# Patient Record
Sex: FEMALE | Race: BLACK OR AFRICAN AMERICAN | Employment: FULL TIME | URBAN - METROPOLITAN AREA
[De-identification: names, ages, dates, MRNs, and addresses within clinical notes are randomized per-mention and may not be internally consistent; named-entity substitution may affect disease eponyms.]

---

## 2019-02-06 ENCOUNTER — HOSPITAL ENCOUNTER (EMERGENCY)
Age: 24
Discharge: HOME | End: 2019-02-06
Attending: EMERGENCY MEDICINE
Payer: SUBSIDIZED

## 2019-02-06 VITALS
HEART RATE: 104 BPM | WEIGHT: 220 LBS | OXYGEN SATURATION: 98 % | SYSTOLIC BLOOD PRESSURE: 154 MMHG | BODY MASS INDEX: 37.56 KG/M2 | DIASTOLIC BLOOD PRESSURE: 86 MMHG | RESPIRATION RATE: 16 BRPM | TEMPERATURE: 100 F | HEIGHT: 64 IN

## 2019-02-06 DIAGNOSIS — J02.0 ACUTE STREPTOCOCCAL PHARYNGITIS: Primary | ICD-10-CM

## 2019-02-06 LAB — DEPRECATED S PYO AG THROAT QL EIA: POSITIVE

## 2019-02-06 PROCEDURE — 99283 EMERGENCY DEPT VISIT LOW MDM: CPT | Performed by: EMERGENCY MEDICINE

## 2019-02-06 PROCEDURE — 87880 STREP A ASSAY W/OPTIC: CPT

## 2019-02-06 RX ORDER — HYDROCODONE BITARTRATE AND ACETAMINOPHEN 5; 325 MG/1; MG/1
1 TABLET ORAL
Qty: 10 TAB | Refills: 0 | Status: SHIPPED | OUTPATIENT
Start: 2019-02-06 | End: 2019-04-22 | Stop reason: ALTCHOICE

## 2019-02-06 RX ORDER — AMOXICILLIN 875 MG/1
875 TABLET, FILM COATED ORAL 2 TIMES DAILY
Qty: 20 TAB | Refills: 0 | Status: SHIPPED | OUTPATIENT
Start: 2019-02-06 | End: 2019-02-16

## 2019-02-06 NOTE — ED PROVIDER NOTES
The history is provided by the patient. Sore Throat This is a new problem. The current episode started 2 days ago. The problem has been gradually worsening. There has been no fever. Associated symptoms include headaches and swollen glands. Pertinent negatives include no diarrhea, no vomiting, no congestion, no drooling, no ear discharge, no ear pain, no plugged ear sensation, no shortness of breath, no stridor, no trouble swallowing, no stiff neck and no cough. She has had no exposure to strep or mono. She has tried nothing for the symptoms. The treatment provided no relief. History reviewed. No pertinent past medical history. History reviewed. No pertinent surgical history. History reviewed. No pertinent family history. Social History Socioeconomic History  Marital status: SINGLE Spouse name: Not on file  Number of children: Not on file  Years of education: Not on file  Highest education level: Not on file Social Needs  Financial resource strain: Not on file  Food insecurity - worry: Not on file  Food insecurity - inability: Not on file  Transportation needs - medical: Not on file  Transportation needs - non-medical: Not on file Occupational History  Not on file Tobacco Use  Smoking status: Never Smoker  Smokeless tobacco: Never Used Substance and Sexual Activity  Alcohol use: Yes  Drug use: No  
 Sexual activity: Not on file Other Topics Concern  Not on file Social History Narrative  Not on file ALLERGIES: Patient has no known allergies. Review of Systems Constitutional: Negative for chills and fever. HENT: Positive for sore throat. Negative for congestion, drooling, ear discharge, ear pain and trouble swallowing. Respiratory: Negative for cough, shortness of breath and stridor. Gastrointestinal: Negative for diarrhea and vomiting. Neurological: Positive for headaches. All other systems reviewed and are negative. Vitals:  
 02/06/19 1005 BP: (!) 149/98 Pulse: (!) 108 Resp: 16 Temp: 100.2 °F (37.9 °C) SpO2: 100% Weight: 99.8 kg (220 lb) Height: 5' 4\" (1.626 m) Physical Exam  
Constitutional: She is oriented to person, place, and time. She appears well-developed and well-nourished. No distress. HENT:  
Head: Normocephalic and atraumatic. Right Ear: Hearing, tympanic membrane and external ear normal.  
Left Ear: Hearing, tympanic membrane and external ear normal.  
Mouth/Throat: Uvula is midline. No uvula swelling. Oropharyngeal exudate and posterior oropharyngeal erythema present. No tonsillar abscesses. Eyes: Conjunctivae and EOM are normal. Pupils are equal, round, and reactive to light. Neck: Normal range of motion. Neck supple. No thyromegaly present. Cardiovascular: Normal rate, regular rhythm, normal heart sounds and intact distal pulses. Pulmonary/Chest: Effort normal and breath sounds normal.  
Abdominal: Soft. Bowel sounds are normal. There is no tenderness. Musculoskeletal: Normal range of motion. She exhibits no edema. Lymphadenopathy:  
  She has cervical adenopathy. Neurological: She is alert and oriented to person, place, and time. Skin: Skin is warm and dry. Capillary refill takes less than 2 seconds. Psychiatric: She has a normal mood and affect. Nursing note and vitals reviewed. MDM Number of Diagnoses or Management Options Acute streptococcal pharyngitis: new and requires workup Amount and/or Complexity of Data Reviewed Clinical lab tests: ordered and reviewed Review and summarize past medical records: yes Risk of Complications, Morbidity, and/or Mortality Presenting problems: low Diagnostic procedures: minimal 
Management options: low Patient Progress Patient progress: stable Procedures The patient was observed in the ED. Results Reviewed: Recent Results (from the past 24 hour(s)) STREP AG SCREEN, GROUP A Collection Time: 02/06/19 10:06 AM  
Result Value Ref Range Group A Strep Ag ID POSITIVE (A) NEG    
 
 
I discussed the results of all labs, procedures, radiographs, and treatments with the patient and available family. Treatment plan is agreed upon and the patient is ready for discharge. All voiced understanding of the discharge plan and medication instructions or changes as appropriate. Questions about treatment in the ED were answered. All were encouraged to return should symptoms worsen or new problems develop.

## 2019-02-06 NOTE — DISCHARGE INSTRUCTIONS
Patient Education        Strep Throat: Care Instructions  Your Care Instructions    Strep throat is a bacterial infection that causes sudden, severe sore throat and fever. Strep throat, which is caused by bacteria called streptococcus, is treated with antibiotics. Sometimes a strep test is necessary to tell if the sore throat is caused by strep bacteria. Treatment can help ease symptoms and may prevent future problems. Follow-up care is a key part of your treatment and safety. Be sure to make and go to all appointments, and call your doctor if you are having problems. It's also a good idea to know your test results and keep a list of the medicines you take. How can you care for yourself at home? · Take your antibiotics as directed. Do not stop taking them just because you feel better. You need to take the full course of antibiotics. · Strep throat can spread to others until 24 hours after you begin taking antibiotics. During this time, you should avoid contact with other people at work or home, especially infants and children. Do not sneeze or cough on others, and wash your hands often. Keep your drinking glass and eating utensils separate from those of others, and wash these items well in hot, soapy water. · Gargle with warm salt water at least once each hour to help reduce swelling and make your throat feel better. Use 1 teaspoon of salt mixed in 8 fluid ounces of warm water. · Take an over-the-counter pain medication, such as acetaminophen (Tylenol), ibuprofen (Advil, Motrin), or naproxen (Aleve). Read and follow all instructions on the label. · Try an over-the-counter anesthetic throat spray or throat lozenges, which may help relieve throat pain. · Drink plenty of fluids. Fluids may help soothe an irritated throat. Hot fluids, such as tea or soup, may help your throat feel better. · Eat soft solids and drink plenty of clear liquids.  Flavored ice pops, ice cream, scrambled eggs, sherbet, and gelatin dessert (such as Jell-O) may also soothe the throat. · Get lots of rest.  · Do not smoke, and avoid secondhand smoke. If you need help quitting, talk to your doctor about stop-smoking programs and medicines. These can increase your chances of quitting for good. · Use a vaporizer or humidifier to add moisture to the air in your bedroom. Follow the directions for cleaning the machine. When should you call for help? Call your doctor now or seek immediate medical care if:    · You have a new or higher fever.     · You have a fever with a stiff neck or severe headache.     · You have new or worse trouble swallowing.     · Your sore throat gets much worse on one side.     · Your pain becomes much worse on one side of your throat.    Watch closely for changes in your health, and be sure to contact your doctor if:    · You are not getting better after 2 days (48 hours).     · You do not get better as expected. Where can you learn more? Go to http://pat-britney.info/. Enter K625 in the search box to learn more about \"Strep Throat: Care Instructions. \"  Current as of: March 27, 2018  Content Version: 11.9  © 4146-7298 [employer]. Care instructions adapted under license by [employer] (which disclaims liability or warranty for this information). If you have questions about a medical condition or this instruction, always ask your healthcare professional. [place] [address] any warranty or liability for your use of this information.

## 2019-02-06 NOTE — ED NOTES
I have reviewed discharge instructions with the patient. The patient verbalized understanding. Patient left ED via Discharge Method: ambulatory to [facility] for questions and clarification provided. Patient given 2 scripts. To continue your aftercare when you leave the hospital, you may receive an automated call from our care team to check in on how you are doing. This is a free service and part of our promise to provide the best care and service to meet your aftercare needs.  If you have questions, or wish to unsubscribe from this service please call [phone]. Thank you for Choosing our [facility] Emergency Department.

## 2019-02-06 NOTE — LETTER
[facility] EMERGENCY DEPT 
[address] [place] [id number] 
[phone] Work/School Note Date: 2/6/2019 To Whom It May concern: 
 
[facility] was seen and treated today in the emergency room by the following provider(s): 
Attending Provider: Cherelle Mcmahan MD.   
 
[facility] may return to work on 2/8/19 Sincerely, Kenya HAYNES

## 2019-04-22 PROBLEM — I10 HYPERTENSION: Status: ACTIVE | Noted: 2019-04-22

## 2019-04-22 PROBLEM — Q51.810 ARCUATE UTERUS: Status: ACTIVE | Noted: 2019-04-22

## 2019-04-22 PROBLEM — E66.01 SEVERE OBESITY (HCC): Status: ACTIVE | Noted: 2019-04-22

## 2019-04-29 PROBLEM — E66.01 SEVERE OBESITY (HCC): Status: RESOLVED | Noted: 2019-04-22 | Resolved: 2019-04-29

## 2019-06-13 PROBLEM — O09.90 SUPERVISION OF HIGH-RISK PREGNANCY: Status: ACTIVE | Noted: 2019-06-13

## 2019-08-06 PROBLEM — O28.3 ABNORMAL FETAL ULTRASOUND: Status: ACTIVE | Noted: 2019-08-06

## 2019-08-12 PROBLEM — O99.212 OBESITY AFFECTING PREGNANCY IN SECOND TRIMESTER: Status: ACTIVE | Noted: 2019-04-22

## 2019-08-12 PROBLEM — O09.92 HIGH-RISK PREGNANCY IN SECOND TRIMESTER: Status: ACTIVE | Noted: 2019-06-13

## 2019-08-13 PROBLEM — O10.012: Status: ACTIVE | Noted: 2019-04-22

## 2019-09-09 PROBLEM — O09.93 HIGH-RISK PREGNANCY IN THIRD TRIMESTER: Status: ACTIVE | Noted: 2019-06-13

## 2019-09-09 PROBLEM — O34.03: Status: ACTIVE | Noted: 2019-04-22

## 2019-09-09 PROBLEM — O99.213 OBESITY AFFECTING PREGNANCY IN THIRD TRIMESTER: Status: ACTIVE | Noted: 2019-04-22

## 2019-09-09 PROBLEM — O10.013: Status: ACTIVE | Noted: 2019-04-22

## 2019-11-07 PROBLEM — O40.9XX0 POLYHYDRAMNIOS AFFECTING PREGNANCY: Status: ACTIVE | Noted: 2019-11-07

## 2019-11-23 PROBLEM — O11.9 CHRONIC HYPERTENSION WITH SUPERIMPOSED PRE-ECLAMPSIA: Status: ACTIVE | Noted: 2019-11-23

## 2019-11-24 ENCOUNTER — HOSPITAL ENCOUNTER (INPATIENT)
Age: 24
LOS: 3 days | Discharge: HOME | End: 2019-11-27
Attending: OBSTETRICS & GYNECOLOGY | Admitting: OBSTETRICS & GYNECOLOGY
Payer: COMMERCIAL

## 2019-11-24 DIAGNOSIS — Z34.90 ENCOUNTER FOR PLANNED INDUCTION OF LABOR: Primary | ICD-10-CM

## 2019-11-24 PROBLEM — Z3A.39 39 WEEKS GESTATION OF PREGNANCY: Status: ACTIVE | Noted: 2019-11-24

## 2019-11-24 LAB
ABO + RH BLD: NORMAL
ALBUMIN SERPL-MCNC: 2.7 G/DL (ref 3.5–5)
ALBUMIN/GLOB SERPL: 0.8 {RATIO} (ref 1.2–3.5)
ALP SERPL-CCNC: 182 U/L (ref 50–136)
ALT SERPL-CCNC: 19 U/L (ref 12–65)
ANION GAP SERPL CALC-SCNC: 5 MMOL/L (ref 7–16)
AST SERPL-CCNC: 14 U/L (ref 15–37)
BILIRUB SERPL-MCNC: 0.3 MG/DL (ref 0.2–1.1)
BLOOD GROUP ANTIBODIES SERPL: NORMAL
BUN SERPL-MCNC: 6 MG/DL (ref 6–23)
CALCIUM SERPL-MCNC: 8.7 MG/DL (ref 8.3–10.4)
CHLORIDE SERPL-SCNC: 107 MMOL/L (ref 98–107)
CO2 SERPL-SCNC: 25 MMOL/L (ref 21–32)
CREAT SERPL-MCNC: 0.53 MG/DL (ref 0.6–1)
ERYTHROCYTE [DISTWIDTH] IN BLOOD BY AUTOMATED COUNT: 14.6 % (ref 11.9–14.6)
GLOBULIN SER CALC-MCNC: 3.6 G/DL (ref 2.3–3.5)
GLUCOSE SERPL-MCNC: 74 MG/DL (ref 65–100)
HCT VFR BLD AUTO: 36.8 % (ref 35.8–46.3)
HGB BLD-MCNC: 11.7 G/DL (ref 11.7–15.4)
LDH SERPL L TO P-CCNC: 170 U/L (ref 100–190)
MCH RBC QN AUTO: 30.1 PG (ref 26.1–32.9)
MCHC RBC AUTO-ENTMCNC: 31.8 G/DL (ref 31.4–35)
MCV RBC AUTO: 94.6 FL (ref 79.6–97.8)
NRBC # BLD: 0 K/UL (ref 0–0.2)
PLATELET # BLD AUTO: 364 K/UL (ref 150–450)
PMV BLD AUTO: 10.7 FL (ref 9.4–12.3)
POTASSIUM SERPL-SCNC: 4 MMOL/L (ref 3.5–5.1)
PROT SERPL-MCNC: 6.3 G/DL (ref 6.3–8.2)
RBC # BLD AUTO: 3.89 M/UL (ref 4.05–5.2)
SODIUM SERPL-SCNC: 137 MMOL/L (ref 136–145)
SPECIMEN EXP DATE BLD: NORMAL
URATE SERPL-MCNC: 3.5 MG/DL (ref 2.6–6)
WBC # BLD AUTO: 6.5 K/UL (ref 4.3–11.1)

## 2019-11-24 PROCEDURE — 74011250636 HC RX REV CODE- 250/636: Performed by: OBSTETRICS & GYNECOLOGY

## 2019-11-24 PROCEDURE — 3E0P7VZ INTRODUCTION OF HORMONE INTO FEMALE REPRODUCTIVE, VIA NATURAL OR ARTIFICIAL OPENING: ICD-10-PCS | Performed by: OBSTETRICS & GYNECOLOGY

## 2019-11-24 PROCEDURE — 83615 LACTATE (LD) (LDH) ENZYME: CPT

## 2019-11-24 PROCEDURE — 80053 COMPREHEN METABOLIC PANEL: CPT

## 2019-11-24 PROCEDURE — 65270000029 HC RM PRIVATE

## 2019-11-24 PROCEDURE — 75410000002 HC LABOR FEE PER 1 HR: Performed by: OBSTETRICS & GYNECOLOGY

## 2019-11-24 PROCEDURE — 85027 COMPLETE CBC AUTOMATED: CPT

## 2019-11-24 PROCEDURE — 84550 ASSAY OF BLOOD/URIC ACID: CPT

## 2019-11-24 PROCEDURE — 74011250637 HC RX REV CODE- 250/637: Performed by: OBSTETRICS & GYNECOLOGY

## 2019-11-24 PROCEDURE — 86900 BLOOD TYPING SEROLOGIC ABO: CPT

## 2019-11-24 RX ORDER — SODIUM CHLORIDE 0.9 % (FLUSH) 0.9 %
5-40 SYRINGE (ML) INJECTION EVERY 8 HOURS
Status: DISCONTINUED | OUTPATIENT
Start: 2019-11-24 | End: 2019-11-25

## 2019-11-24 RX ORDER — ZOLPIDEM TARTRATE 5 MG/1
5 TABLET ORAL
Status: DISCONTINUED | OUTPATIENT
Start: 2019-11-24 | End: 2019-11-25

## 2019-11-24 RX ORDER — LIDOCAINE HYDROCHLORIDE 10 MG/ML
1 INJECTION INFILTRATION; PERINEURAL
Status: ACTIVE | OUTPATIENT
Start: 2019-11-24 | End: 2019-11-25

## 2019-11-24 RX ORDER — OXYTOCIN/RINGER'S LACTATE 30/500 ML
0-25 PLASTIC BAG, INJECTION (ML) INTRAVENOUS
Status: DISCONTINUED | OUTPATIENT
Start: 2019-11-24 | End: 2019-11-25 | Stop reason: HOSPADM

## 2019-11-24 RX ORDER — LIDOCAINE HYDROCHLORIDE 20 MG/ML
JELLY TOPICAL
Status: ACTIVE | OUTPATIENT
Start: 2019-11-24 | End: 2019-11-25

## 2019-11-24 RX ORDER — SODIUM CHLORIDE 0.9 % (FLUSH) 0.9 %
5-40 SYRINGE (ML) INJECTION AS NEEDED
Status: DISCONTINUED | OUTPATIENT
Start: 2019-11-24 | End: 2019-11-25

## 2019-11-24 RX ORDER — OXYTOCIN/RINGER'S LACTATE 15/250 ML
250 PLASTIC BAG, INJECTION (ML) INTRAVENOUS ONCE
Status: ACTIVE | OUTPATIENT
Start: 2019-11-24 | End: 2019-11-24

## 2019-11-24 RX ORDER — MINERAL OIL
120 OIL (ML) ORAL
Status: ACTIVE | OUTPATIENT
Start: 2019-11-24 | End: 2019-11-25

## 2019-11-24 RX ORDER — DEXTROSE, SODIUM CHLORIDE, SODIUM LACTATE, POTASSIUM CHLORIDE, AND CALCIUM CHLORIDE 5; .6; .31; .03; .02 G/100ML; G/100ML; G/100ML; G/100ML; G/100ML
125 INJECTION, SOLUTION INTRAVENOUS CONTINUOUS
Status: DISCONTINUED | OUTPATIENT
Start: 2019-11-24 | End: 2019-11-25

## 2019-11-24 RX ORDER — BUTORPHANOL TARTRATE 2 MG/ML
1 INJECTION INTRAMUSCULAR; INTRAVENOUS
Status: DISCONTINUED | OUTPATIENT
Start: 2019-11-24 | End: 2019-11-25 | Stop reason: HOSPADM

## 2019-11-24 RX ADMIN — MISOPROSTOL 50 MCG: 100 TABLET ORAL at 22:16

## 2019-11-24 RX ADMIN — MISOPROSTOL 50 MCG: 100 TABLET ORAL at 08:49

## 2019-11-24 RX ADMIN — MISOPROSTOL 50 MCG: 100 TABLET ORAL at 13:40

## 2019-11-24 RX ADMIN — ZOLPIDEM TARTRATE 5 MG: 5 TABLET ORAL at 23:02

## 2019-11-24 RX ADMIN — BUTORPHANOL TARTRATE 1 MG: 2 INJECTION, SOLUTION INTRAMUSCULAR; INTRAVENOUS at 23:53

## 2019-11-24 NOTE — PROGRESS NOTES
Pt admitted to Room 428 for Cytotec induction. Admission assessment completed. Discussed plan of care with patient. Discussed pt's choice of infant feeding method. Feeding options explored, including the importance of exclusive breastfeeding. Pt informed of our breastfeeding support system from from nursing staff and lactation staff during inpatient stay and following discharge. Questions and concerns addressed at this time. Pt confirms  breastfeeding is her decision at this time.

## 2019-11-24 NOTE — H&P
History & Physical    Name: Marylen Grizzle MRN: [id number]  SSN: [id number]    YOB: 1995  Age: 20 y.o. Sex: female        Subjective:  20 y.o.     39w0d  admitted for PreE. /88 3 d ago, Repeat 128/86.  24 h urine 417 mg protein. Advised to come in for IOL. Other PreE labs nl. Problem List  Date Reviewed: 2019          Codes Class Noted    39 weeks gestation of pregnancy ICD-10-CM: Z3A.39  ICD-9-CM: V22.2  2019        Encounter for planned induction of labor ICD-10-CM: Z34.90  ICD-9-CM: V22.1  2019        Chronic hypertension with superimposed pre-eclampsia ICD-10-CM: O11.9  ICD-9-CM: 642.70  2019    Overview Addendum 2019 10:49 PM by Alysia Cloud MD     24hr urine elevated 417  (Baseline P:C (8wks) wnl at 0.16 )  Now qualifies for CHTN/NOREEN vs PreEclampsia without severe features     Given >/= 37w0d needs IOL                 Polyhydramnios affecting pregnancy ICD-10-CM: O40. 9XX0  ICD-9-CM: 657.00  2019    Overview Signed 2019  9:13 AM by Alysia Cloud MD     Last growht 10/23/19:  GP 60%, AC 79%, ELO 21 (1 pocket >/=8, c/w Polyhydramnios)   (for repeat around )     BPP today , ELO 27 (1 pocket 9cm)--c/w Poly   Vertex     Watch carbs   Serial growths  weekly BPPs             Abnormal fetal ultrasound ICD-10-CM: O28.3  ICD-9-CM: 796.5  2019    Overview Addendum 2019  4:25 PM by Kodi Evans RN     Still inadequate views of heart today (23w2d)   Rt ventricle and moderator band appears thickened   Nose/lips appear wnl    She was offered [facility] referral at the beginning for severe obesity and possible arcuate uterus but originally declined  Agreeable to referral to [facility] today to complete anatomy     2019 at [facility]: Normal anatomy; Normal heart structures except prominent moderator band and possible VSD.   AC 67%, overall 59%, ELO 18 cm, UA Dopplers WNL CL 3.6 cm.    9/10/2019 at [facility]:  Appropriate fetal growth, probable small VSD. AC 61%, overall 65%, ELO 20.9 cm, UA Dopplers WNL. Patient reassured should not cause any long term problems and will close on its own.   9/26/2019 at [facility]:  Peds Echo with Dr. Elysia Cook today; see his report for full details. ELO normal, reassuring fetal status. · No follow up scheduled at [facility]; will see back prn. · Weekly fetal testing in OB office at 34 weeks. · Peds Echo 2 weeks post-partum. High-risk pregnancy in third trimester ICD-10-CM: O09.93  ICD-9-CM: V23.9  6/13/2019    Overview Addendum 9/10/2019  8:55 AM by Yokasta Lynn RN     Genetics declined  Possible arcuate uterus  Offered [facility] referral for severe obesity and possible arcuate uterus --Pt Declines     See Abnormal Fetal U/S Overview             Obesity affecting pregnancy in third trimester ICD-10-CM: O99.213  ICD-9-CM: 649.13  4/22/2019    Overview Addendum 10/23/2019  4:36 PM by Zita Marie MD     Early glucola   hgb A1C wnl  ASA 162mg (12-36wks)   - had flu shot and tdap ~ 32 wks    - 10/23/2019 34w3d EFW = 5lb13 oz (60%)  HC=43%, AC=79%.   Af=20             Congenital abnormality of uterus during pregnancy in third trimester ICD-10-CM: O34.03  ICD-9-CM: 654.03, 752.39  4/22/2019    Overview Addendum 9/26/2019  5:40 PM by Tatianna Restrepo MD     Possible arcuate uterus? --8wk US:  GS divides at fundal tip; uterine wall at fundus appears wnl w/ 3D images    See Abnormal Fetal U/S Overview             Essential hypertension complicating pregnancy, third trimester ICD-10-CM: O10.013  ICD-9-CM: 642.03  4/22/2019    Overview Addendum 9/26/2019  4:23 PM by Tatianna Restrepo MD     Pt denies previous diagnosis; however BP in ER for sore throat (2/6/19) 149/98, 154/86   BP today low normotensive at pregnancy intake 116/78, 108/78  Baseline HELLP labs wnl  Baseline P:C (8wks) 0.16   ASA 162mg (12-36wks)   monitor closely    8/13/2019 at [facility]:  BP normal today  9/10/2019 at [facility]:  BP stable, no preeclamptic symptoms. 2019 at [facility]:  BP stable, no preeclamptic symptoms. See Abnormal Fetal U/S Overview                 Estimated Date of Delivery: 19  OB History    Para Term  AB Living   1             SAB TAB Ectopic Molar Multiple Live Births                    # Outcome Date GA Lbr Sherman/2nd Weight Sex Delivery Anes PTL Lv   1 Current                Ms. Carlos Alberto Faustin is admitted with pregnancy at 39w0d for induction of labor. See Problem list for details of Prenatal course. Please see prenatal records for details. Past Medical History:   Diagnosis Date    BMI 38.0-38.9,adult     Hypertension     Mom has High Blood pressure; pt has not had high blood pressure     Past Surgical History:   Procedure Laterality Date    BREAST SURGERY PROCEDURE UNLISTED      HX BREAST BIOPSY Right     benign    HX WISDOM TEETH EXTRACTION       Social History     Occupational History    Not on file   Tobacco Use    Smoking status: Never Smoker    Smokeless tobacco: Never Used   Substance and Sexual Activity    Alcohol use: Not Currently     Comment: none now    Drug use: No    Sexual activity: Yes     Partners: Male     Birth control/protection: None     Family History   Problem Relation Age of Onset    Hypertension Mother     Diabetes Father     Lung Disease Father        No Known Allergies  Prior to Admission medications    Medication Sig Start Date End Date Taking? Authorizing Provider   ferrous sulfate (SLOW FE) 142 mg (45 mg iron) ER tablet Take 1 Tab by mouth Daily (before breakfast). 10/8/19  Yes Dulce Parnell MD   calcium carbonate (TUMS) 200 mg calcium (500 mg) chew Take 1 Tab by mouth daily. Yes Provider, Historical   acetaminophen (TYLENOL EXTRA STRENGTH) 500 mg tablet Take  by mouth every six (6) hours as needed for Pain. Yes Provider, Historical   PNV No12-Iron-FA-DSS-OM-3 29 mg iron-1 mg -50 mg CPKD Take  by mouth.    Yes Provider, Historical   ascorbic acid, vitamin C, (VITAMIN C) 500 mg tablet Take 1 Tab by mouth daily. 10/8/19   Laila Badillo MD   ondansetron (ZOFRAN ODT) 8 mg disintegrating tablet Take 1 Tab by mouth every eight (8) hours as needed for Nausea. 8/27/19   Caroline Osullivan MD   aspirin delayed-release 81 mg tablet Take 2 Tabs by mouth daily. 5/16/19   Caroline Osullivan MD        Review of Systems: A comprehensive review of systems was negative except for that written in the HPI. Objective:     Vitals:  Vitals:    11/24/19 0806 11/24/19 0819 11/24/19 1210   BP:  137/70 134/87   Pulse:  (!) 102 81   Resp:  16    Temp: 98.6 °F (37 °C)          Physical Exam:  Patient without distress. Heart: Regular rate and rhythm, S1S2 present or without murmur or extra heart sounds  Lung: clear to auscultation throughout lung fields, no wheezes, no rales, no rhonchi and normal respiratory effort  Fundus: soft and non tender  Cervical Exam:  Ext os FT/unable to assess int os or effacement due to pt discomfort, cervix is -4 and deviated slightly to pt's left. Lower Extremities:  - Edema 1+   - Patellar Reflexes: 1+ bilaterally   - Clonus: absent  Membranes:  Intact  Fetal Heart Rate: Reactive  Baseline: 140s per minute  Accelerations: yes  Uterine contractions: regular, every 1-2 minutes    Prenatal Labs:   Lab Results   Component Value Date/Time    ABO/Rh(D) A POSITIVE 11/24/2019 08:08 AM         Assessment/Plan:     Active Problems:    39 weeks gestation of pregnancy (11/24/2019)      Encounter for planned induction of labor (11/24/2019)    EFW 7 lb 3 oz  @ 37w5d. ELO 12  ELO increased to 24  3d ago    Bedside US Vertex  Pt is s/p 50 mcg cytotec, plan q 4 hours. D/w pt, her twin sister and mom that this could be a 2-3 day process. Pt is feeling mild cramping, denies pain. No PreE sx, pt denies:  HA, visual changes/RUQ or epigastric pain. Plan: Admit for Mild PreE. .  Group B Strep was negative.

## 2019-11-25 ENCOUNTER — ANESTHESIA (OUTPATIENT)
Dept: LABOR AND DELIVERY | Age: 24
End: 2019-11-25
Payer: COMMERCIAL

## 2019-11-25 ENCOUNTER — ANESTHESIA EVENT (OUTPATIENT)
Dept: LABOR AND DELIVERY | Age: 24
End: 2019-11-25
Payer: COMMERCIAL

## 2019-11-25 LAB
ARTERIAL PATENCY WRIST A: ABNORMAL
ARTERIAL PATENCY WRIST A: ABNORMAL
BASE DEFICIT BLD-SCNC: 4 MMOL/L
BASE DEFICIT BLD-SCNC: 5 MMOL/L
BDY SITE: ABNORMAL
BDY SITE: ABNORMAL
BODY TEMPERATURE: 98.6
BODY TEMPERATURE: 98.6
CO2 BLD-SCNC: 22 MMOL/L
CO2 BLD-SCNC: 23 MMOL/L
COLLECT TIME,HTIME: 1557
COLLECT TIME,HTIME: 1557
GAS FLOW.O2 O2 DELIVERY SYS: ABNORMAL L/MIN
GAS FLOW.O2 O2 DELIVERY SYS: ABNORMAL L/MIN
HCO3 BLD-SCNC: 21.9 MMOL/L (ref 22–26)
HCO3 BLDV-SCNC: 20.9 MMOL/L (ref 23–28)
PCO2 BLDCO: 37 MMHG (ref 32–68)
PCO2 BLDCO: 46 MMHG (ref 32–68)
PH BLDCO: 7.29 [PH] (ref 7.15–7.38)
PH BLDCO: 7.36 [PH] (ref 7.15–7.38)
PO2 BLDCO: 21 MMHG
PO2 BLDCO: 32 MMHG
SAO2 % BLD: 29 % (ref 95–98)
SAO2 % BLDV: 59 % (ref 65–88)
SERVICE CMNT-IMP: ABNORMAL
SERVICE CMNT-IMP: ABNORMAL
SPECIMEN TYPE: ABNORMAL
SPECIMEN TYPE: ABNORMAL

## 2019-11-25 PROCEDURE — 77030018846 HC SOL IRR STRL H20 ICUM -A

## 2019-11-25 PROCEDURE — 76060000078 HC EPIDURAL ANESTHESIA

## 2019-11-25 PROCEDURE — 74011000250 HC RX REV CODE- 250: Performed by: NURSE ANESTHETIST, CERTIFIED REGISTERED

## 2019-11-25 PROCEDURE — 10907ZC DRAINAGE OF AMNIOTIC FLUID, THERAPEUTIC FROM PRODUCTS OF CONCEPTION, VIA NATURAL OR ARTIFICIAL OPENING: ICD-10-PCS | Performed by: OBSTETRICS & GYNECOLOGY

## 2019-11-25 PROCEDURE — 74011000250 HC RX REV CODE- 250: Performed by: OBSTETRICS & GYNECOLOGY

## 2019-11-25 PROCEDURE — A4300 CATH IMPL VASC ACCESS PORTAL: HCPCS | Performed by: NURSE ANESTHETIST, CERTIFIED REGISTERED

## 2019-11-25 PROCEDURE — 74011250636 HC RX REV CODE- 250/636: Performed by: NURSE ANESTHETIST, CERTIFIED REGISTERED

## 2019-11-25 PROCEDURE — 77030014125 HC TY EPDRL BBMI -B: Performed by: NURSE ANESTHETIST, CERTIFIED REGISTERED

## 2019-11-25 PROCEDURE — 74011250636 HC RX REV CODE- 250/636: Performed by: OBSTETRICS & GYNECOLOGY

## 2019-11-25 PROCEDURE — 65270000029 HC RM PRIVATE

## 2019-11-25 PROCEDURE — 77030003028 HC SUT VCRL J&J -A

## 2019-11-25 PROCEDURE — 0KQM0ZZ REPAIR PERINEUM MUSCLE, OPEN APPROACH: ICD-10-PCS | Performed by: OBSTETRICS & GYNECOLOGY

## 2019-11-25 PROCEDURE — 77030011945 HC CATH URIN INT ST MENT -A

## 2019-11-25 PROCEDURE — 74011250637 HC RX REV CODE- 250/637: Performed by: OBSTETRICS & GYNECOLOGY

## 2019-11-25 PROCEDURE — 77030002888 HC SUT CHRMC J&J -A

## 2019-11-25 PROCEDURE — 82803 BLOOD GASES ANY COMBINATION: CPT

## 2019-11-25 PROCEDURE — 75410000003 HC RECOV DEL/VAG/CSECN EA 0.5 HR

## 2019-11-25 PROCEDURE — 75410000002 HC LABOR FEE PER 1 HR

## 2019-11-25 PROCEDURE — 77030011943

## 2019-11-25 PROCEDURE — 75410000000 HC DELIVERY VAGINAL/SINGLE

## 2019-11-25 RX ORDER — DOCUSATE SODIUM 100 MG/1
100 CAPSULE, LIQUID FILLED ORAL 2 TIMES DAILY
Status: DISCONTINUED | OUTPATIENT
Start: 2019-11-25 | End: 2019-11-27 | Stop reason: HOSPADM

## 2019-11-25 RX ORDER — LOPERAMIDE HYDROCHLORIDE 2 MG/1
2 CAPSULE ORAL AS NEEDED
Status: DISCONTINUED | OUTPATIENT
Start: 2019-11-25 | End: 2019-11-27 | Stop reason: HOSPADM

## 2019-11-25 RX ORDER — LIDOCAINE HYDROCHLORIDE AND EPINEPHRINE 15; 5 MG/ML; UG/ML
INJECTION, SOLUTION EPIDURAL AS NEEDED
Status: DISCONTINUED | OUTPATIENT
Start: 2019-11-25 | End: 2019-11-26 | Stop reason: HOSPADM

## 2019-11-25 RX ORDER — ZOLPIDEM TARTRATE 5 MG/1
5 TABLET ORAL
Status: DISCONTINUED | OUTPATIENT
Start: 2019-11-25 | End: 2019-11-27 | Stop reason: HOSPADM

## 2019-11-25 RX ORDER — HYDROCODONE BITARTRATE AND ACETAMINOPHEN 7.5; 325 MG/1; MG/1
1 TABLET ORAL
Status: DISCONTINUED | OUTPATIENT
Start: 2019-11-25 | End: 2019-11-27 | Stop reason: HOSPADM

## 2019-11-25 RX ORDER — ROPIVACAINE HYDROCHLORIDE 2 MG/ML
INJECTION, SOLUTION EPIDURAL; INFILTRATION; PERINEURAL
Status: DISCONTINUED | OUTPATIENT
Start: 2019-11-25 | End: 2019-11-26 | Stop reason: HOSPADM

## 2019-11-25 RX ORDER — SIMETHICONE 80 MG
80 TABLET,CHEWABLE ORAL
Status: DISCONTINUED | OUTPATIENT
Start: 2019-11-25 | End: 2019-11-27 | Stop reason: HOSPADM

## 2019-11-25 RX ORDER — IBUPROFEN 800 MG/1
800 TABLET ORAL
Status: DISCONTINUED | OUTPATIENT
Start: 2019-11-25 | End: 2019-11-27 | Stop reason: HOSPADM

## 2019-11-25 RX ORDER — MINERAL OIL
120 OIL (ML) ORAL AS NEEDED
Status: DISCONTINUED | OUTPATIENT
Start: 2019-11-25 | End: 2019-11-25

## 2019-11-25 RX ORDER — ROPIVACAINE HYDROCHLORIDE 2 MG/ML
INJECTION, SOLUTION EPIDURAL; INFILTRATION; PERINEURAL AS NEEDED
Status: DISCONTINUED | OUTPATIENT
Start: 2019-11-25 | End: 2019-11-26 | Stop reason: HOSPADM

## 2019-11-25 RX ORDER — OXYTOCIN/RINGER'S LACTATE 30/500 ML
250 PLASTIC BAG, INJECTION (ML) INTRAVENOUS
Status: DISCONTINUED | OUTPATIENT
Start: 2019-11-25 | End: 2019-11-25

## 2019-11-25 RX ORDER — ONDANSETRON 2 MG/ML
4 INJECTION INTRAMUSCULAR; INTRAVENOUS ONCE
Status: DISCONTINUED | OUTPATIENT
Start: 2019-11-25 | End: 2019-11-25

## 2019-11-25 RX ORDER — IBUPROFEN 800 MG/1
800 TABLET ORAL ONCE
Status: COMPLETED | OUTPATIENT
Start: 2019-11-25 | End: 2019-11-25

## 2019-11-25 RX ORDER — OXYTOCIN/RINGER'S LACTATE 30/500 ML
250 PLASTIC BAG, INJECTION (ML) INTRAVENOUS
Status: DISCONTINUED | OUTPATIENT
Start: 2019-11-25 | End: 2019-11-27

## 2019-11-25 RX ORDER — ONDANSETRON 4 MG/1
4 TABLET, ORALLY DISINTEGRATING ORAL
Status: DISCONTINUED | OUTPATIENT
Start: 2019-11-25 | End: 2019-11-27 | Stop reason: HOSPADM

## 2019-11-25 RX ORDER — DIPHENHYDRAMINE HCL 25 MG
25 CAPSULE ORAL
Status: DISCONTINUED | OUTPATIENT
Start: 2019-11-25 | End: 2019-11-27 | Stop reason: HOSPADM

## 2019-11-25 RX ADMIN — BUTORPHANOL TARTRATE 1 MG: 2 INJECTION, SOLUTION INTRAMUSCULAR; INTRAVENOUS at 03:07

## 2019-11-25 RX ADMIN — HYDROCODONE BITARTRATE AND ACETAMINOPHEN 1 TABLET: 7.5; 325 TABLET ORAL at 19:08

## 2019-11-25 RX ADMIN — IBUPROFEN 800 MG: 800 TABLET, FILM COATED ORAL at 16:46

## 2019-11-25 RX ADMIN — LIDOCAINE HYDROCHLORIDE,EPINEPHRINE BITARTRATE 3 ML: 15; .005 INJECTION, SOLUTION EPIDURAL; INFILTRATION; INTRACAUDAL; PERINEURAL at 07:32

## 2019-11-25 RX ADMIN — MISOPROSTOL 50 MCG: 100 TABLET ORAL at 03:23

## 2019-11-25 RX ADMIN — Medication 2 MILLI-UNITS/MIN: at 07:19

## 2019-11-25 RX ADMIN — Medication 250 ML/HR: at 18:24

## 2019-11-25 RX ADMIN — Medication 10 ML: at 07:42

## 2019-11-25 RX ADMIN — SODIUM CHLORIDE, SODIUM LACTATE, POTASSIUM CHLORIDE, CALCIUM CHLORIDE, AND DEXTROSE MONOHYDRATE 125 ML/HR: 600; 310; 30; 20; 5 INJECTION, SOLUTION INTRAVENOUS at 07:01

## 2019-11-25 RX ADMIN — MINERAL OIL 120 ML: 471.95 OIL ORAL at 15:30

## 2019-11-25 RX ADMIN — WITCH HAZEL 1 PAD: 500 SOLUTION RECTAL; TOPICAL at 22:24

## 2019-11-25 RX ADMIN — IBUPROFEN 800 MG: 800 TABLET, FILM COATED ORAL at 22:26

## 2019-11-25 RX ADMIN — HYDROCODONE BITARTRATE AND ACETAMINOPHEN 1 TABLET: 7.5; 325 TABLET ORAL at 23:10

## 2019-11-25 RX ADMIN — FAMOTIDINE 20 MG: 10 INJECTION, SOLUTION INTRAVENOUS at 11:56

## 2019-11-25 RX ADMIN — ROPIVACAINE HYDROCHLORIDE 10 ML/HR: 2 INJECTION, SOLUTION EPIDURAL; INFILTRATION at 07:42

## 2019-11-25 RX ADMIN — Medication 1 SPRAY: at 22:24

## 2019-11-25 RX ADMIN — SODIUM CHLORIDE, SODIUM LACTATE, POTASSIUM CHLORIDE, AND CALCIUM CHLORIDE 500 ML: 600; 310; 30; 20 INJECTION, SOLUTION INTRAVENOUS at 07:00

## 2019-11-25 NOTE — PROGRESS NOTES
SBAR IN Report: Mother    Verbal report received from Tyrel Park RN  on this patient, who is now being transferred from L&D for routine progression of care. The patient is not wearing a green \"Anesthesia-Duramorph\" band. Report consisted of patient's Situation, Background, Assessment and Recommendations (SBAR).  ID bands were compared with the identification form, and verified with the patient and transferring nurse. Information from the SBAR and the [facility] [place] Report was reviewed with the transferring nurse; opportunity for questions and clarification provided.

## 2019-11-25 NOTE — ANESTHESIA PREPROCEDURE EVALUATION
Relevant Problems   No relevant active problems       Anesthetic History   No history of anesthetic complications     Pertinent negatives:  Increased risk of difficult airway: .       Review of Systems / Medical History  Patient summary reviewed and pertinent labs reviewed    Pulmonary  Within defined limits                 Neuro/Psych   Within defined limits           Cardiovascular    Hypertension              Exercise tolerance: >4 METS     GI/Hepatic/Renal  Within defined limits              Endo/Other        Morbid obesity (Denies Coagulapathies)     Other Findings              Physical Exam    Airway  Mallampati: II  TM Distance: 4 - 6 cm  Neck ROM: normal range of motion   Mouth opening: Normal     Cardiovascular    Rhythm: regular  Rate: normal         Dental  No notable dental hx       Pulmonary  Breath sounds clear to auscultation               Abdominal  GI exam deferred       Other Findings            Anesthetic Plan    ASA: 3  Anesthesia type: epidural            Anesthetic plan and risks discussed with: Patient and Family

## 2019-11-25 NOTE — PROGRESS NOTES
1-2/25-50/-3/posterior cervix deviated to pt's left, exam still difficult secondary to pt apprehension. Pt wants to shower. Will continue po Cytotec until AL. If no AL by morning will switch to pitocin. FHTs 140s w/ accels, contractions considerably spaced.

## 2019-11-25 NOTE — PROGRESS NOTES
MD updated that pt is 3/70/-3. Pt may have epidural. To start pitocin at 0725. May increase pitocin 2 units q 15 min.

## 2019-11-25 NOTE — L&D DELIVERY NOTE
Delivery Summary    Patient: Svitlana Pimentel MRN: [id number]  SSN: [id number]    YOB: 1995  Age: 20 y.o. Sex: female       Information for the patient's :  Gina Sol [[id number]]       Labor Events:    Labor: No    Steroids: None   Cervical Ripening Date/Time:       Cervical Ripening Type: Misoprostol   Antibiotics During Labor: No   Rupture Identifier:      Rupture Date/Time:       Rupture Type: AROM   Amniotic Fluid Volume: Large    Amniotic Fluid Description: Clear    Amniotic Fluid Odor:      Induction: Oxytocin;Prostaglandins       Induction Date/Time: 2019 8:49 AM    Indications for Induction: Mild Preeclampsia;Chronic Hypertension    Augmentation: None   Augmentation Date/Time:      Indications for Augmentation:     Labor complications: None       Additional complications:        Delivery Events:  Indications For Episiotomy:     Episiotomy: None   Perineal Laceration(s): 2nd   Repaired:     Periurethral Laceration Location:      Repaired:     Labial Laceration Location:     Repaired:     Sulcal Laceration Location:     Repaired:     Vaginal Laceration Location:     Repaired:     Cervical Laceration Location:     Repaired:     Repair Suture: Chromic 3-0;Vicryl 2-0   Number of Repair Packets: 2   Estimated Blood Loss (ml): 300ml     Delivery Date: 2019    Delivery Time: 3:57 PM  Delivery Type: Vaginal, Spontaneous  Sex:  Male    Gestational Age: 36w3d   Delivery Clinician:  Nini Shaw  Living Status: Living   Delivery Location: &D 428          APGARS  One minute Five minutes Ten minutes   Skin color: 0   1        Heart rate: 2   2        Grimace: 2   2        Muscle tone: 2   2        Breathin   2        Totals: 8   9            Presentation: Vertex    Position: Right Occiput Anterior  Resuscitation Method:  Suctioning-bulb; Tactile Stimulation     Meconium Stained: None      Cord Information: 3 Vessels  Complications: Nuchal Cord Without Compressions  Cord around: head  Delayed cord clamping? Yes  Cord clamped date/time:2019  3:58 PM  Disposition of Cord Blood: Lab    Blood Gases Sent?: Yes    Placenta:  Date/Time: 2019  4:02 PM  Removal: Spontaneous      Appearance: Normal;Intact      Measurements:  Birth Weight: 7 lb 5.1 oz (3.32 kg)      Birth Length: 52 cm      Head Circumference: 31.5 cm      Chest Circumference: 32.5 cm     Abdominal Girth:       Other Providers:   Yadira PERALTA;ERASMO HUFFMAN;HILARIO MARIE;KALEB LLOYD;LUIS MARQUEZ, Obstetrician;Primary Nurse;Primary  Nurse;Charge Nurse;Scrub Tech           Group B Strep: No results found for: Thomas Bookbinder  Information for the patient's :  Edy Villar [[id number]]   No results found for: ABORH, PCTABR, PCTDIG, BILI, ABORHEXT, [address]    Recent Labs     19  1614 19  1613   PCO2CB 37 46   PO2CB 32 21   HCO3I  --  21.9*   SO2I  --  29*   IBD 4 5   PTEMPI 98.6 98.6   SPECTI VENOUS CORD ARTERIAL CORD   PHICB 7.357 7.285   ISITE CORD CORD   IDEV OTHER OTHER   IALLEN NOT APPLICABLE NOT APPLICABLE

## 2019-11-25 NOTE — LACTATION NOTE
Assisted pt to get infant to latch for breast feeding. After many attempts was able to get latched and was nursing well when nurse left room after 8 min of feeding.

## 2019-11-25 NOTE — PROGRESS NOTES
11/24/19 1721   Cervical Exam   Dilation (cm) 1  (1-2)   Eff 25 %   Station -3   Vaginal exam done by?   Reaganforadha Avilaii off for pt to shower per MD approval.

## 2019-11-25 NOTE — ANESTHESIA PROCEDURE NOTES
Epidural Block    Start time: 11/25/2019 7:37 AM  End time: 11/25/2019 7:41 AM  Performed by: Giuliana Woods MD  Authorized by: Giuliana Woods MD     Pre-Procedure  Indication: at surgeon's request, post-op pain management, procedure for pain and labor epidural    Preanesthetic Checklist: patient identified, risks and benefits discussed, anesthesia consent, site marked, patient being monitored, timeout performed and anesthesia consent    Timeout Time: 07:37        Epidural:   Patient position:  Seated  Prep region:  Lumbar  Prep: Chlorhexidine    Location:  L3-4    Needle and Epidural Catheter:   Needle Type:  Tuohy  Needle Gauge:  17 G  Injection Technique:  Loss of resistance using saline  Attempts:  1  Catheter Size:  19 G  Catheter at Skin Depth (cm):  15  Depth in Epidural Space (cm):  8  Events: no blood with aspiration, no cerebrospinal fluid with aspiration, no paresthesia and negative aspiration test    Test Dose:  Lidocaine 1.5% w/ epi and negative    Assessment:   Catheter Secured:  Tegaderm and tape  Insertion:  Uncomplicated  Patient tolerance:  Patient tolerated the procedure well with no immediate complications

## 2019-11-25 NOTE — PROGRESS NOTES
8046 Bedside and Verbal shift change report given to JEREMY Rubin RN (oncoming nurse) by Yessenia Merchant. Darci Davis RN (offgoing nurse). Report included the following information SBAR.   53 Bee Gue[address]a telemetry not working. EFM and toco applied per RN  2339 Pt. Has requested epidural. Orders received from Dr. Tonya Watkins for epidural placement by EMILIE Sebastian RN. Fluid bolus started per EMILIE Sebastian RN. Dr. Tenny Shone and [facility] CRNA called per JEREMY Rubin RN  0274 Dr. Tenny Shone and [facility] CRNA at bedside for epidural placement. EFM tracing maternal HR while pt. Sitting up.   0723 Time out for epidural placement. 0731 Epidural placed without difficulty per Dr. Tenny Shone. [address] [place]. 0817 Pt. Repositioned to right side with peanut ball. 500 cc fluid bolus started. Dr. Selina Gates called in. Updated on pt. Status. He is in OR but available if needed. 0847 500 cc bolus complete  0858 Sterile straight cath per RN. 25 cc clear yellow urine drained. SVE per RN 5/100/-2. Bugling bag of water noted. Pt. Repositioned to left side with peanut ball. 0945 Pt. Repositioned to right side without peanut ball, per pt. Request. 200 cc fluid bolus started. [id number] Dr. Selina Gates at bedside. RN requests a FHR strip review. Strip reviewed per Dr. Aviva Boas. No new orders received. SVE per RN Cathie 7/100/0. AROM per Dr. Aviva Boas without difficulty. Clear fluids noted. 1144 Sterile straight cath per RN. 50 cc clear yellow urine drained. SVE per RN 8/100/0. Pt. C/o heartburn. Dr. Selina Gates called per RN. Orders for Pepcid IV received. 1242 Pt. Repositioned to left side with peanut ball. Pt. Feeling nausea at this time. EFM adjusted per RN  1323 SVE per RN 9/100/0.  Pt. Repositioned to right side with peanut ball

## 2019-11-25 NOTE — PROGRESS NOTES
11/25/19 1033   Cervical Exam   Dilation (cm) 7   Eff 100 %   Station 0   Position Mid   Baby Position Vertex   Membrane Status AROM     Chart reviewed, pt seen and examined.  Continue induction

## 2019-11-25 NOTE — PROGRESS NOTES
SBAR OUT Report: Mother    Verbal report given to Hiro Peterson RN (full name & credentials) on this patient, who is now being transferred to MIU (unit) for routine progression of care. The patient is not wearing a green \"Anesthesia-Duramorph\" band. Report consisted of patient's Situation, Background, Assessment and Recommendations (SBAR).  ID bands were compared with the identification form, and verified with the patient and receiving nurse. Information from the SBAR and the [address] [facility] Report was reviewed with the receiving nurse; opportunity for questions and clarification provided.

## 2019-11-26 PROCEDURE — 74011250637 HC RX REV CODE- 250/637: Performed by: OBSTETRICS & GYNECOLOGY

## 2019-11-26 PROCEDURE — 65270000029 HC RM PRIVATE

## 2019-11-26 RX ORDER — HYDROCORTISONE ACETATE PRAMOXINE HCL 2.5; 1 G/100G; G/100G
CREAM TOPICAL
Status: DISCONTINUED | OUTPATIENT
Start: 2019-11-26 | End: 2019-11-27 | Stop reason: HOSPADM

## 2019-11-26 RX ADMIN — DOCUSATE SODIUM 100 MG: 100 CAPSULE, LIQUID FILLED ORAL at 18:26

## 2019-11-26 RX ADMIN — HYDROCODONE BITARTRATE AND ACETAMINOPHEN 1 TABLET: 7.5; 325 TABLET ORAL at 15:03

## 2019-11-26 RX ADMIN — IBUPROFEN 800 MG: 800 TABLET, FILM COATED ORAL at 15:03

## 2019-11-26 RX ADMIN — HYDROCORTISONE ACETATE PRAMOXINE HCL: 2.5; 1 CREAM TOPICAL at 15:03

## 2019-11-26 RX ADMIN — IBUPROFEN 800 MG: 800 TABLET, FILM COATED ORAL at 23:15

## 2019-11-26 RX ADMIN — DOCUSATE SODIUM 100 MG: 100 CAPSULE, LIQUID FILLED ORAL at 08:50

## 2019-11-26 RX ADMIN — IBUPROFEN 800 MG: 800 TABLET, FILM COATED ORAL at 04:20

## 2019-11-26 RX ADMIN — HYDROCODONE BITARTRATE AND ACETAMINOPHEN 1 TABLET: 7.5; 325 TABLET ORAL at 06:55

## 2019-11-26 RX ADMIN — HYDROCODONE BITARTRATE AND ACETAMINOPHEN 1 TABLET: 7.5; 325 TABLET ORAL at 02:55

## 2019-11-26 RX ADMIN — HYDROCODONE BITARTRATE AND ACETAMINOPHEN 1 TABLET: 7.5; 325 TABLET ORAL at 21:01

## 2019-11-26 NOTE — LACTATION NOTE
Assisted to get infant to latch on right side, multilple attempts then changed from cradle to football and he latched and was nursing well when nurse left room at [address]

## 2019-11-26 NOTE — PROGRESS NOTES
Post-Partum Day Number 1 Progress Note  [address]  [id number]    Patient doing well post-partum without significant complaint. Voiding without difficulty, normal lochia. Vitals:    Patient Vitals for the past 8 hrs:   BP Temp Pulse Resp SpO2   19 1108 132/77 98.2 °F (36.8 °C) 96 16 99 %   19 0707 125/71 98.4 °F (36.9 °C) 87 16 99 %     Temp (24hrs), Av.3 °F (36.8 °C), Min:98.2 °F (36.8 °C), Max:98.5 °F (36.9 °C)      Vital signs stable, afebrile. Exam:  Patient without distress. Abdomen soft, fundus firm at level of umbilicus, nontender               Labs:   Recent Results (from the past 24 hour(s))   BLOOD GAS, CORD BLOOD    Collection Time: 19  4:13 PM   Result Value Ref Range    Device: OTHER      pH, cord blood (POC) 7.285 7.15 - 7.38      pCO2 cord blood 46 32 - 68 mmHg    pO2 cord blood 21 mmHg    HCO3 (POC) 21.9 (L) 22 - 26 MMOL/L    sO2 (POC) 29 (L) 95 - 98 %    Base deficit (POC) 5 mmol/L    Allens test (POC) NOT APPLICABLE      Site CORD      Patient temp. 98.6      Specimen type (POC) ARTERIAL CORD      Performed by DzhurOksanaRT     CO2, POC 23 MMOL/L    COLLECT TIME 1,557     BLOOD GAS, CORD BLOOD    Collection Time: 19  4:14 PM   Result Value Ref Range    Device: OTHER      pH, cord blood (POC) 7.357 7. 15 - 7.38      pCO2 cord blood 37 32 - 68 mmHg    pO2 cord blood 32 mmHg    HCO3, venous (POC) 20.9 (L) 23 - 28 MMOL/L    sO2, venous (POC) 59 (L) 65 - 88 %    Base deficit (POC) 4 mmol/L    Allens test (POC) NOT APPLICABLE      Site CORD      Patient temp. 98.6      Specimen type (POC) VENOUS CORD      Performed by DzhurOksanaRT     CO2, POC 22 MMOL/L    COLLECT TIME 1,557         Assessment and Plan:  Patient appears to be having uncomplicated post-partum course. Continue routine perineal care and maternal education. Plan discharge tomorrow if no problems occur. D/w pt getting an appt for next wk for bp ck.

## 2019-11-26 NOTE — PROGRESS NOTES
Chart reviewed- first time parent.  met with family and provided education/pamphlet on [facility] Postpartum  Home Visit. Family would like to receive home visit. Referral will be made at discharge.  provided informational packet on  mood disorder education/resources. Family receptive to receiving information and denied any additional needs from . Family has 's contact information should any needs/questions arise. No PCP - list of PCPs provided.     ROCKY Erazo  [place]   [phone]

## 2019-11-26 NOTE — LACTATION NOTE
This note was copied from a baby's chart. Lactation visit. First time parents. Baby not yet 24 hours old. Mom reports baby latched post birth but has not since then. Attempts only. Has attempted frequently but baby sleepy at the breast. Reviewed feeding expectations in first 24 hours of life. Discussed feeding cues. Waking measures as needed. Feeding on demand. Assisted mom to wake infant. Baby roused when undressed. Suck assessed- fair suck but does tongue thrust some. Assisted in football hold on right breast. Everted nipple. Extra large breasts. Showed mom how to compress breast tissue and supportive hold for infant. Baby would open mouth and latch briefly but would not sustain latch. Kept coming off nipple. Repeated attempts, no latch. Discussed options and mom eager to pump now. Reviewed pump function, pump parts and expectations. Had to decrease pump suction slightly. Showed mom hands on pumping technique. No colostrum returned at first pump session. Reassured mom about normalcy, will watch closely. Baby needs more time to learn to latch well. Attempt at all feeds. If no latch, or too sleepy, continue to pump and feed back any pumped milk. No need to supplement at this time, will watch weight loss and output closely. RN updated.

## 2019-11-26 NOTE — ANESTHESIA POSTPROCEDURE EVALUATION
* No procedures listed *.    epidural    Anesthesia Post Evaluation      Multimodal analgesia: multimodal analgesia used between 6 hours prior to anesthesia start to PACU discharge  Patient location during evaluation: bedside  Patient participation: complete - patient participated  Level of consciousness: responsive to verbal stimuli  Pain management: adequate  Airway patency: patent  Anesthetic complications: no  Cardiovascular status: hemodynamically stable  Respiratory status: spontaneous ventilation  Hydration status: stable  Comments: The patient was satisfied with her labor epidural and denies any complications. Her lower extremities have returned to baseline neurologically. No vitals data found for the desired time range.

## 2019-11-26 NOTE — PROGRESS NOTES
Motrin 800 mg and Norco 7.5 mg given po for complaints of perineal pain and cramping. 11/26/19 2183   Pain Assessment   Pain Scale 1 Numeric (0 - 10)   Pain Intensity 1 8   Pain Location 1 Abdomen;Perineum; Rectal   Pain Orientation 1 Lower   Pain Description 1 Aching;Sore;Cramping

## 2019-11-27 VITALS
TEMPERATURE: 98 F | OXYGEN SATURATION: 97 % | HEART RATE: 91 BPM | RESPIRATION RATE: 18 BRPM | DIASTOLIC BLOOD PRESSURE: 86 MMHG | SYSTOLIC BLOOD PRESSURE: 138 MMHG

## 2019-11-27 PROCEDURE — 74011250637 HC RX REV CODE- 250/637: Performed by: OBSTETRICS & GYNECOLOGY

## 2019-11-27 RX ORDER — IBUPROFEN 800 MG/1
800 TABLET ORAL
Qty: 90 TAB | Refills: 1 | Status: SHIPPED | OUTPATIENT
Start: 2019-11-27 | End: 2020-02-10

## 2019-11-27 RX ORDER — HYDROCODONE BITARTRATE AND ACETAMINOPHEN 7.5; 325 MG/1; MG/1
1 TABLET ORAL
Qty: 10 TAB | Refills: 0 | Status: SHIPPED | OUTPATIENT
Start: 2019-11-27 | End: 2019-11-30

## 2019-11-27 RX ADMIN — HYDROCODONE BITARTRATE AND ACETAMINOPHEN 1 TABLET: 7.5; 325 TABLET ORAL at 05:13

## 2019-11-27 RX ADMIN — IBUPROFEN 800 MG: 800 TABLET, FILM COATED ORAL at 05:12

## 2019-11-27 NOTE — DISCHARGE SUMMARY
Obstetrical Discharge Summary     Name: Chen Pearce MRN: [id number]  SSN: [id number]    YOB: 1995  Age: 20 y.o. Sex: female      Allergies: Patient has no known allergies. Admit Date: 2019    Discharge Date: 2019     Admitting Physician: Praveen Up MD     Attending Physician:  Emma Robles MD     * Admission Diagnoses: 39 weeks gestation of pregnancy [Z3A.39]; Encounter for planned induction of labor [Z34.90]    * Discharge Diagnoses:   Information for the patient's :  Shameka Wiley [[id number]]   Delivery of a 7 lb 5.1 oz (3.32 kg) male infant via Vaginal, Spontaneous on 2019 at 3:57 PM  by Glenn Agarwal. Apgars were 8  and 9 . Additional Diagnoses:   Hospital Problems as of 2019 Date Reviewed: 2019          Codes Class Noted - Resolved POA    39 weeks gestation of pregnancy ICD-10-CM: Z3A.39  ICD-9-CM: V22.2  2019 - Present Unknown        Encounter for planned induction of labor ICD-10-CM: Z34.90  ICD-9-CM: V22.1  2019 - Present Unknown        * (Principal) Chronic hypertension with superimposed pre-eclampsia ICD-10-CM: O11.9  ICD-9-CM: 642.70  2019 - Present Yes    Overview Addendum 2019 10:49 PM by Aileen Garcia MD     24hr urine elevated 417  (Baseline P:C (8wks) wnl at 0.16 )  Now qualifies for CHTN/NOREEN vs PreEclampsia without severe features     Given >/= 37w0d needs IOL                 Polyhydramnios affecting pregnancy ICD-10-CM: O40. 9XX0  ICD-9-CM: 657.00  2019 - Present Yes    Overview Signed 2019  9:13 AM by Aileen Garcia MD     Last growht 10/23/19:  GP 60%, AC 79%, ELO 21 (1 pocket >/=8, c/w Polyhydramnios)   (for repeat around )     BPP today , ELO 27 (1 pocket 9cm)--c/w Poly   Vertex     Watch carbs   Serial growths  weekly BPPs             Abnormal fetal ultrasound ICD-10-CM: O28.3  ICD-9-CM: 796.5  2019 - Present Yes    Overview Addendum 2019 4:25 PM by Shahab Poon RN     Still inadequate views of heart today (23w2d)   Rt ventricle and moderator band appears thickened   Nose/lips appear wnl    She was offered [facility] referral at the beginning for severe obesity and possible arcuate uterus but originally declined  Agreeable to referral to [facility] today to complete anatomy     8/13/2019 at [facility]: Normal anatomy; Normal heart structures except prominent moderator band and possible VSD. AC 67%, overall 59%, ELO 18 cm, UA Dopplers WNL CL 3.6 cm.    9/10/2019 at [facility]:  Appropriate fetal growth, probable small VSD. AC 61%, overall 65%, ELO 20.9 cm, UA Dopplers WNL. Patient reassured should not cause any long term problems and will close on its own.   9/26/2019 at [facility]:  Peds Echo with Dr. Edmond Malcolm today; see his report for full details. ELO normal, reassuring fetal status. · No follow up scheduled at [facility]; will see back prn. · Weekly fetal testing in OB office at 34 weeks. · Peds Echo 2 weeks post-partum. High-risk pregnancy in third trimester ICD-10-CM: O09.93  ICD-9-CM: V23.9  6/13/2019 - Present Yes    Overview Addendum 9/10/2019  8:55 AM by Shahab Poon RN     Genetics declined  Possible arcuate uterus  Offered [facility] referral for severe obesity and possible arcuate uterus --Pt Declines     See Abnormal Fetal U/S Overview             Obesity affecting pregnancy in third trimester ICD-10-CM: O99.213  ICD-9-CM: 649.13  4/22/2019 - Present Yes    Overview Addendum 10/23/2019  4:36 PM by Gerald Carrasco MD     Early glucola   hgb A1C wnl  ASA 162mg (12-36wks)   - had flu shot and tdap ~ 32 wks    - 10/23/2019 34w3d EFW = 5lb13 oz (60%)  HC=43%, AC=79%.   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 --8wk US:  GS divides at fundal tip; uterine wall at fundus appears wnl w/ 3D images    See Abnormal Fetal U/S Overview             Essential hypertension complicating pregnancy, third trimester ICD-10-CM: O10.013  ICD-9-CM: 642.03  2019 - Present Yes    Overview Addendum 2019  4:23 PM by Ángel Salazar MD     Pt denies previous diagnosis; however BP in ER for sore throat (19) 149/98, 154/86   BP today low normotensive at pregnancy intake 116/78, 108/78  Baseline HELLP labs wnl  Baseline P:C (8wks) 0.16   ASA 162mg (12-36wks)   monitor closely    2019 at [facility]:  BP normal today  9/10/2019 at [facility]:  BP stable, no preeclamptic symptoms. 2019 at [facility]:  BP stable, no preeclamptic symptoms. See Abnormal Fetal U/S Overview                  Lab Results   Component Value Date/Time    ABO/Rh(D) A POSITIVE 2019 08:08 AM      Immunization History   Administered Date(s) Administered    Influenza Vaccine (Quad) PF 2019       * Procedures:       Meta  Depression Scale  I have been able to laugh and see the funny side of things: As much as I always could  I have looked forward with enjoyment to things: As much as I ever did  I have blamed myself unnecessarily when things went wrong: Not very often  I have been anxious or worried for no good reason: Yes, sometimes  I have felt scared or panicky for no very good reason: No, not much  Things have been getting on top of me: No, most of the time I have coped quite well  I have been so unhappy that I have had difficulty sleeping: No, not at all  I have felt sad or miserable: Not very often  I have been so unhappy that I have been crying: Only occasionally  The thought of harming myself has occurred to me: Hardly ever  Total Score: 8    * Discharge Condition: good    [facility] Course: Normal hospital course following the delivery.     * Disposition: Home    Discharge Medications:   Current Discharge Medication List      START taking these medications    Details ibuprofen (MOTRIN) 800 mg tablet Take 1 Tab by mouth every six (6) hours as needed for Pain. Qty: 90 Tab, Refills: 1      HYDROcodone-acetaminophen (NORCO) 7.5-325 mg per tablet Take 1 Tab by mouth every four (4) hours as needed for Pain for up to 3 days. Max Daily Amount: 6 Tabs. Qty: 10 Tab, Refills: 0    Associated Diagnoses: Encounter for planned induction of labor         CONTINUE these medications which have NOT CHANGED    Details   ferrous sulfate (SLOW FE) 142 mg (45 mg iron) ER tablet Take 1 Tab by mouth Daily (before breakfast). Qty: 30 Tab, Refills: 9      calcium carbonate (TUMS) 200 mg calcium (500 mg) chew Take 1 Tab by mouth daily. acetaminophen (TYLENOL EXTRA STRENGTH) 500 mg tablet Take  by mouth every six (6) hours as needed for Pain. PNV No12-Iron-FA-DSS-OM-3 29 mg iron-1 mg -50 mg CPKD Take  by mouth. ascorbic acid, vitamin C, (VITAMIN C) 500 mg tablet Take 1 Tab by mouth daily. Qty: 30 Tab, Refills: 9      ondansetron (ZOFRAN ODT) 8 mg disintegrating tablet Take 1 Tab by mouth every eight (8) hours as needed for Nausea. Qty: 20 Tab, Refills: 1      aspirin delayed-release 81 mg tablet Take 2 Tabs by mouth daily. Qty: 90 Tab, Refills: 1             * Follow-up Care/Patient Instructions:   Activity: No sex for 6 weeks, No driving while on analgesics and No heavy lifting for 6 weeks  Diet: Regular Diet  Wound Care: Keep wound clean and dry    Follow-up Information     Follow up With Specialties Details Why Contact Info    None    None ([phone]) Patient stated that they have no PCP

## 2019-11-27 NOTE — PROGRESS NOTES
Patient discharged to home per MD orders. Discharge instructions reviewed with patient. Prescription given, Questions encouraged and answered. Patient verbalizes understanding.

## 2019-11-27 NOTE — LACTATION NOTE
Lactation visit. In to check on feeds. Mom states baby latching ok. Feeds well at some feeds but not every feeding. Baby well over 25 hours old now. Reviewed feeding needs. Wake baby to feed every 3 hours. If baby not latching well every feeding, need to pump and feed back pumped milk. Feeding plan given and reviewed. Mom declined pump rental. Has personal use pump for home use as needed. Questions answered. Baby sleeping now in visitors arms, recently circumcised. Has not fed in several hours, mom eager to discharge. Need to watch baby closely, watch output. No medical need to supplement now but will need close follow up if baby not latching well. Mom states understanding.

## 2019-11-27 NOTE — LACTATION NOTE
This note was copied from a baby's chart. Individualized Feeding Plan for Breastfeeding   Lactation Services [phone]      As much as possible, hold your baby on your chest so babys bare skin is against your bare skin with a blanket covering babys back, especially 30 minutes before it is time for baby to eat. Watch for early feeding cues such as, licking lips, sucking motions, rooting, hands to mouth. Crying is a late feeding cue. Feed your baby at least 8 times in 24 hours, or more if your baby is showing feeding cues. If baby is sleepy put baby skin to skin and watch for hunger cues. To rouse baby: unwrap, undress, massage hands, feet, & back, change diaper, gently change babys position from lying to sitting. 15-20 minutes on the first breast of active breastfeeding is considered a good feeding. Good, active breastfeeding is when baby is alert, tugging the nipple, their ear may move, and you can hear swallows. Allow baby to finish the first side before changing sides. Sleeping at the breast or only brief, light sucks should not be considered a good, full breastfeed. At each feeding:  __x__1. Do Suck Practice on finger before each feeding until sucking pattern is smooth. Try using index finger. Nail down towards tongue. __x__2. Hand Express for a few minutes prior to latching to help start milk flow. __x__3. Baby needs to NURSE WELL x 15-20 minutes on at least first breast, burp and offer 2nd breast at every feeding. If no sustained latch only attempt at breast for 10 minutes. If baby does NOT latch on and feed well on at least one side, you should:   __x__4. Double pump for 15 minutes with breast massage and compression. Hand express for an additional 2-3 minutes per side. Pump after each feeding attempt or poor feeding, up to 8 times per day. If you are not putting baby to the breast you need to pump 8 times a day. Pump every 3 hours. __x__5.  Give baby all of the breast milk you obtain using a straight syringe or  curved syringe. If baby does NOT have enough wet and dirty diapers per day, is jaundiced/lethargic, or has significant weight loss AND you do NOT pump enough milk for each feeding (per volume listed below), formula supplementation may need to be used. Call lactation department /pediatrician if you have concerns. AVERAGE INTAKES OF COLOSTRUM BY HEALTHY  INFANTS:  Time  Day Intake (ml/feed)  Based on 8 feedings per day. 1st 24 hrs  1 2-10 ml  24-48 hrs  2 5-15 ml  48-72 hrs  3 15-30 ml (0.5-1 oz)  72-96 hrs  4 30-45 ml (1-1.5oz)  Amount listed is milk needed per feeding                          5-6       45-60 ml (1.5-2oz)                           7          60-75ml (2-2.5oz)    By day 7, baby will need 60-75 ml or 2-2.5 oz at each feeding based on 8 feedings per day & babys weight. (1oz = 30ml). Total milk volume needed in 24 hours by Day 7 is 18-20.0 oz per day based on baby's birthweight of 7-5. The more often baby eats, the less volume they need per feeding. If baby is eating more often than the minimum of 8 times per day, they may take less per feeding. Comments: Use feeding plan until follow up with pediatrician. Continue to attempt at the breast for most feeds. Pump every 3 hours if no latch. Give all pumped colostrum/breastmilk at each feeding.

## 2019-11-27 NOTE — LACTATION NOTE
Mom and baby are going home today. Continue to offer the breast without restriction. Mom's milk should be fully in over the next few days. Reviewed engorgement precautions. Hand Expression has been demoed and written hand-out reviewed. As milk comes in baby will be more alert at the breast and swallows will be more obvious. Breasts may feel softer once baby has finished nursing. Baby should be back to birth weight by 3weeks of age. And then gain on average 1 oz per day for the next 2-3 months. Reviewed babies should be exclusively breastfeeding for the first 6 months and that breastfeeding should continue after introduction of appropriate complimentary foods after 6 months. Initial output should be at least 1 wet and 1 bowel movement for each day old baby is. By day 5-7 once milk is fully in baby will consistently have 6 or more soaking wet diapers and about 4 bowel movement. Some babies have a bowel movement with every feeding and some have 1-3 large bowel movements each day. Inadequate output may indicate inadequate feedings and should be reported to your Pediatrician. Bowel habits may change as baby gets older. Encouraged follow-up at Pediatrician in 1-2 days, no later than 1 week of age. Call [facility] for any questions as needed or to set up an OP visit. OP phone calls are returned within 24 hours. Community Breastfeeding Resource List given.

## 2019-11-27 NOTE — DISCHARGE INSTRUCTIONS
Patient Education        After Your Delivery (the Postpartum Period): Care Instructions  Your Care Instructions    Congratulations on the birth of your baby. Like pregnancy, the  period can be a time of excitement, sasha, and exhaustion. You may look at your wondrous little baby and feel happy. You may also be overwhelmed by your new sleep hours and new responsibilities. At first, babies often sleep during the days and are awake at night. They do not have a pattern or routine. They may make sudden gasps, jerk themselves awake, or look like they have crossed eyes. These are all normal, and they may even make you smile. In these first weeks after delivery, try to take good care of yourself. It may take 4 to 6 weeks to feel like yourself again, and possibly longer if you had a  birth. You will likely feel very tired for several weeks. Your days will be full of ups and downs, but lots of sasha as well. Follow-up care is a key part of your treatment and safety. Be sure to make and go to all appointments, and call your doctor if you are having problems. It's also a good idea to know your test results and keep a list of the medicines you take. How can you care for yourself at home? Take care of your body after delivery  · Use pads instead of tampons for the bloody flow that may last as long as 2 weeks. · Ease cramps with ibuprofen (Advil, Motrin). · Ease soreness of hemorrhoids and the area between your vagina and rectum with ice compresses or witch hazel pads. · Ease constipation by drinking lots of fluid and eating high-fiber foods. Ask your doctor about over-the-counter stool softeners. · Cleanse yourself with a gentle squeeze of warm water from a bottle instead of wiping with toilet paper. · Take a sitz bath in warm water several times a day. · Wear a good nursing bra. Ease sore and swollen breasts with warm, wet washcloths.   · If you are not breastfeeding, use ice rather than heat for breast soreness. · Your period may not start for several months if you are breastfeeding. You may bleed more, and longer at first, than you did before you got pregnant. · Wait until you are healed (about 4 to 6 weeks) before you have sexual intercourse. Your doctor will tell you when it is okay to have sex. · Try not to travel with your baby for 5 or 6 weeks. If you take a long car trip, make frequent stops to walk around and stretch. Avoid exhaustion  · Rest every day. Try to nap when your baby naps. · Ask another adult to be with you for a few days after delivery. · Plan for  if you have other children. · Stay flexible so you can eat at odd hours and sleep when you need to. Both you and your baby are making new schedules. · Plan small trips to get out of the house. Change can make you feel less tired. · Ask for help with housework, cooking, and shopping. Remind yourself that your job is to care for your baby. Know about help for postpartum depression  · \"Baby blues\" are common for the first 1 to 2 weeks after birth. You may cry or feel sad or irritable for no reason. · Rest whenever you can. Being tired makes it harder to handle your emotions. · Go for walks with your baby. · Talk to your partner, friends, and family about your feelings. · If your symptoms last for more than a few weeks, or if you feel very depressed, ask your doctor for help. · Postpartum depression can be treated. Support groups and counseling can help. Sometimes medicine can also help. Stay healthy  · Eat healthy foods so you have more energy and lose extra baby pounds. · If you breastfeed, avoid drugs. If you quit smoking during pregnancy, try to stay smoke-free. If you choose to have a drink now and then, have only one drink, and limit the number of occasions that you have a drink. Wait to breastfeed at least 2 hours after you have a drink to reduce the amount of alcohol the baby may get in the milk.   · Start daily exercise after 4 to 6 weeks, but rest when you feel tired. · Learn exercises to tone your belly. Do Kegel exercises to regain strength in your pelvic muscles. You can do these exercises while you stand or sit. ? Squeeze the same muscles you would use to stop your urine. Your belly and thighs should not move. ? Hold the squeeze for 3 seconds, and then relax for 3 seconds. ? Start with 3 seconds. Then add 1 second each week until you are able to squeeze for 10 seconds. ? Repeat the exercise 10 to 15 times for each session. Do three or more sessions each day. · Find a class for new mothers and new babies that has an exercise time. · If you had a  birth, give yourself a bit more time before you exercise, and be careful. When should you call for help? Call 911 anytime you think you may need emergency care. For example, call if:    · You have thoughts of harming yourself, your baby, or another person.     · You passed out (lost consciousness).     · You have chest pain, are short of breath, or cough up blood.     · You have a seizure.    Call your doctor now or seek immediate medical care if:    · You have severe vaginal bleeding. This means you are passing blood clots and soaking through a pad each hour for 2 or more hours.     · You are dizzy or lightheaded, or you feel like you may faint.     · You have a fever.     · You have new or more belly pain.     · You have signs of a blood clot in your leg (called a deep vein thrombosis), such as:  ? Pain in the calf, back of the knee, thigh, or groin. ? Redness and swelling in your leg or groin.     · You have signs of preeclampsia, such as:  ? Sudden swelling of your face, hands, or feet. ? New vision problems (such as dimness, blurring, or seeing spots).   ? A severe headache.    Watch closely for changes in your health, and be sure to contact your doctor if:    · Your vaginal bleeding seems to be getting heavier.     · You have new or worse vaginal discharge.     · You feel sad, anxious, or hopeless for more than a few days.     · You do not get better as expected. Where can you learn more? Go to http://pat-britney.info/. Enter A461 in the search box to learn more about \"After Your Delivery (the Postpartum Period): Care Instructions. \"  Current as of: May 29, 2019  Content Version: 12.2  © 2232-6300 [employer]. Care instructions adapted under license by [employer] (which disclaims liability or warranty for this information). If you have questions about a medical condition or this instruction, always ask your healthcare professional. [place] [address] any warranty or liability for your use of this information. DISCHARGE SUMMARY from Nurse    PATIENT INSTRUCTIONS:    After general anesthesia or intravenous sedation, for 24 hours or while taking prescription Narcotics:  · Limit your activities  · Do not drive and operate hazardous machinery  · Do not make important personal or business decisions  · Do  not drink alcoholic beverages  · If you have not urinated within 8 hours after discharge, please contact your surgeon on call. Report the following to your surgeon:  · Excessive pain, swelling, redness or odor of or around the surgical area  · Temperature over 100.5  · Nausea and vomiting lasting longer than 4 hours or if unable to take medications  · Any signs of decreased circulation or nerve impairment to extremity: change in color, persistent  numbness, tingling, coldness or increase pain  · Any questions    What to do at Home:    Nothing in the vagina for 6 weeks. Call MD if experiencing heavy vaginal bleeding greater than 1 pad per hour, temperature over 100.4, foul smelling vaginal discharge, thoughts of suicide or homicide, symptoms of postpartum depression or mastitis, or any other major medical concerns.               *  Please give a list of your current medications to your Primary Care Provider. *  Please update this list whenever your medications are discontinued, doses are      changed, or new medications (including over-the-counter products) are added. *  Please carry medication information at all times in case of emergency situations. These are general instructions for a healthy lifestyle:    No smoking/ No tobacco products/ Avoid exposure to second hand smoke  Surgeon General's Warning:  Quitting smoking now greatly reduces serious risk to your health. Obesity, smoking, and sedentary lifestyle greatly increases your risk for illness    A healthy diet, regular physical exercise & weight monitoring are important for maintaining a healthy lifestyle    You may be retaining fluid if you have a history of heart failure or if you experience any of the following symptoms:  Weight gain of 3 pounds or more overnight or 5 pounds in a week, increased swelling in our hands or feet or shortness of breath while lying flat in bed. Please call your doctor as soon as you notice any of these symptoms; do not wait until your next office visit. The discharge information has been reviewed with the patient. The patient verbalized understanding. Discharge medications reviewed with the patient and appropriate educational materials and side effects teaching were provided.   ___________________________________________________________________________________________________________________________________

## 2019-11-27 NOTE — PROGRESS NOTES
Progress Note                               Patient: Geoffrey Bazan MRN: [id number]  SSN: [id number]    YOB: 1995  Age: 20 y.o. Sex: female      Postpartum Day Number 2    Subjective:     Patient doing well postpartum without significant complaints. Voiding without difficulty. Patient reports normal lochia. Pain well controlled, voiding on her own without difficulty. Breast feeding. Denies HA, SOB/CP, RUQ pain, Vision changes. Objective:     Patient Vitals for the past 12 hrs:   Temp Pulse Resp BP SpO2   19 0733 98 °F (36.7 °C) 91 18 138/86 97 %   19 0500 98.1 °F (36.7 °C) 93 18 (!) 133/91 97 %   19 0100 97.9 °F (36.6 °C) 92 18 123/79 96 %       Temp (24hrs), Av °F (36.7 °C), Min:97.6 °F (36.4 °C), Max:98.2 °F (36.8 °C)      Physical Exam:    Constitutional: She appears well-developed and well-nourished. No distress. HENT:    Head: Normocephalic and atraumatic. Cardiovascular: RRR  Pulmonary/Chest: CTAB  Abd:  S/NTTP/ND, no RUQ TTP, BS normoactive, fundus firm at umbilicus  Ext:  No c/c/e; DTRs 3+       Lab/Data Review:  PIH:   Recent Labs     19  1254      K 4.0      CO2 25   AGAP 5*   GLU 74   BUN 6   CREA 0.53*   GFRAA >60   GFRNA >60   CA 8.7   ALB 2.7*   TP 6.3   *   GLOB 3.6*   AGRAT 0.8*   SGOT 14*   ALT 19      Recent Labs     19  1254      URICA 3.5     GBS: No results found for: GRBSEXT, GRBSEXT  Blood Type:   Lab Results   Component Value Date/Time    ABO/Rh(D) A POSITIVE 2019 08:08 AM        Assessment and Plan:     20 y.o.  ppd# 2 s/p :    1) PP:  Meeting all pp goals, continue routine care; appropriate for DC home today  2) Breast feeding, Rh pos, Rub imm    3) CHTN/NOREEN w/out severe features vs PreE w/out severe features:  HELLP lbs wnl , bps normotensive - low mild on no antihypertensives. FU in office 1wk for bp check.  PP preE precautions given         Signed By: Josiephine Gowers, MD November 27, 2019

## 2019-11-27 NOTE — PROGRESS NOTES
[address] November 27, 2019       RE: Malaika Boast    To Whom it May Concern: This is to certify that Camryn Paris has been at the hospital from 11/26/2019 to 11/27/2019.       Sincerely,    Dwayne Tapia RN

## 2019-12-05 ENCOUNTER — HOSPITAL ENCOUNTER (EMERGENCY)
Age: 24
Discharge: HOME | End: 2019-12-05
Attending: EMERGENCY MEDICINE
Payer: COMMERCIAL

## 2019-12-05 VITALS
DIASTOLIC BLOOD PRESSURE: 88 MMHG | HEART RATE: 60 BPM | HEIGHT: 64 IN | WEIGHT: 226 LBS | TEMPERATURE: 99 F | OXYGEN SATURATION: 98 % | BODY MASS INDEX: 38.58 KG/M2 | SYSTOLIC BLOOD PRESSURE: 132 MMHG | RESPIRATION RATE: 17 BRPM

## 2019-12-05 DIAGNOSIS — N39.0 URINARY TRACT INFECTION WITHOUT HEMATURIA, SITE UNSPECIFIED: ICD-10-CM

## 2019-12-05 DIAGNOSIS — R11.2 NON-INTRACTABLE VOMITING WITH NAUSEA, UNSPECIFIED VOMITING TYPE: Primary | ICD-10-CM

## 2019-12-05 LAB
ALBUMIN SERPL-MCNC: 2.8 G/DL (ref 3.5–5)
ALBUMIN/GLOB SERPL: 0.7 {RATIO} (ref 1.2–3.5)
ALP SERPL-CCNC: 116 U/L (ref 50–130)
ALT SERPL-CCNC: 45 U/L (ref 12–65)
ANION GAP SERPL CALC-SCNC: 4 MMOL/L (ref 7–16)
APPEARANCE UR: ABNORMAL
AST SERPL-CCNC: 14 U/L (ref 15–37)
BACTERIA URNS QL MICRO: ABNORMAL /HPF
BASOPHILS # BLD: 0 K/UL (ref 0–0.2)
BASOPHILS NFR BLD: 0 % (ref 0–2)
BILIRUB SERPL-MCNC: 0.3 MG/DL (ref 0.2–1.1)
BILIRUB UR QL: NEGATIVE
BUN SERPL-MCNC: 7 MG/DL (ref 6–23)
CALCIUM SERPL-MCNC: 8.7 MG/DL (ref 8.3–10.4)
CASTS URNS QL MICRO: ABNORMAL /LPF
CHLORIDE SERPL-SCNC: 106 MMOL/L (ref 98–107)
CO2 SERPL-SCNC: 29 MMOL/L (ref 21–32)
COLOR UR: YELLOW
CREAT SERPL-MCNC: 0.68 MG/DL (ref 0.6–1)
DIFFERENTIAL METHOD BLD: ABNORMAL
EOSINOPHIL # BLD: 0.1 K/UL (ref 0–0.8)
EOSINOPHIL NFR BLD: 1 % (ref 0.5–7.8)
EPI CELLS #/AREA URNS HPF: ABNORMAL /HPF
ERYTHROCYTE [DISTWIDTH] IN BLOOD BY AUTOMATED COUNT: 14.6 % (ref 11.9–14.6)
GLOBULIN SER CALC-MCNC: 4.2 G/DL (ref 2.3–3.5)
GLUCOSE SERPL-MCNC: 88 MG/DL (ref 65–100)
GLUCOSE UR STRIP.AUTO-MCNC: NEGATIVE MG/DL
HCT VFR BLD AUTO: 39.6 % (ref 35.8–46.3)
HGB BLD-MCNC: 12.4 G/DL (ref 11.7–15.4)
HGB UR QL STRIP: ABNORMAL
IMM GRANULOCYTES # BLD AUTO: 0.1 K/UL (ref 0–0.5)
IMM GRANULOCYTES NFR BLD AUTO: 1 % (ref 0–5)
KETONES UR QL STRIP.AUTO: NEGATIVE MG/DL
LEUKOCYTE ESTERASE UR QL STRIP.AUTO: ABNORMAL
LYMPHOCYTES # BLD: 2.5 K/UL (ref 0.5–4.6)
LYMPHOCYTES NFR BLD: 28 % (ref 13–44)
MCH RBC QN AUTO: 30.3 PG (ref 26.1–32.9)
MCHC RBC AUTO-ENTMCNC: 31.3 G/DL (ref 31.4–35)
MCV RBC AUTO: 96.8 FL (ref 79.6–97.8)
MONOCYTES # BLD: 0.8 K/UL (ref 0.1–1.3)
MONOCYTES NFR BLD: 9 % (ref 4–12)
NEUTS SEG # BLD: 5.5 K/UL (ref 1.7–8.2)
NEUTS SEG NFR BLD: 62 % (ref 43–78)
NITRITE UR QL STRIP.AUTO: NEGATIVE
NRBC # BLD: 0 K/UL (ref 0–0.2)
PH UR STRIP: 7.5 [PH] (ref 5–9)
PLATELET # BLD AUTO: 509 K/UL (ref 150–450)
PMV BLD AUTO: 9.4 FL (ref 9.4–12.3)
POTASSIUM SERPL-SCNC: 3.3 MMOL/L (ref 3.5–5.1)
PROT SERPL-MCNC: 7 G/DL (ref 6.3–8.2)
PROT UR STRIP-MCNC: ABNORMAL MG/DL
RBC # BLD AUTO: 4.09 M/UL (ref 4.05–5.2)
RBC #/AREA URNS HPF: ABNORMAL /HPF
SODIUM SERPL-SCNC: 139 MMOL/L (ref 136–145)
SP GR UR REFRACTOMETRY: 1.01 (ref 1–1.02)
UROBILINOGEN UR QL STRIP.AUTO: 2 EU/DL (ref 0.2–1)
WBC # BLD AUTO: 8.9 K/UL (ref 4.3–11.1)
WBC URNS QL MICRO: >100 /HPF

## 2019-12-05 PROCEDURE — 74011000258 HC RX REV CODE- 258: Performed by: EMERGENCY MEDICINE

## 2019-12-05 PROCEDURE — 85025 COMPLETE CBC W/AUTO DIFF WBC: CPT

## 2019-12-05 PROCEDURE — 96375 TX/PRO/DX INJ NEW DRUG ADDON: CPT | Performed by: EMERGENCY MEDICINE

## 2019-12-05 PROCEDURE — 99282 EMERGENCY DEPT VISIT SF MDM: CPT | Performed by: EMERGENCY MEDICINE

## 2019-12-05 PROCEDURE — 74011250636 HC RX REV CODE- 250/636: Performed by: EMERGENCY MEDICINE

## 2019-12-05 PROCEDURE — 80053 COMPREHEN METABOLIC PANEL: CPT

## 2019-12-05 PROCEDURE — 81001 URINALYSIS AUTO W/SCOPE: CPT

## 2019-12-05 PROCEDURE — 96365 THER/PROPH/DIAG IV INF INIT: CPT | Performed by: EMERGENCY MEDICINE

## 2019-12-05 RX ORDER — DIPHENHYDRAMINE HYDROCHLORIDE 50 MG/ML
12.5 INJECTION, SOLUTION INTRAMUSCULAR; INTRAVENOUS
Status: COMPLETED | OUTPATIENT
Start: 2019-12-05 | End: 2019-12-05

## 2019-12-05 RX ORDER — LOPERAMIDE HCL 2 MG
TABLET ORAL
Qty: 30 TAB | Refills: 0 | Status: SHIPPED | OUTPATIENT
Start: 2019-12-05 | End: 2020-02-10

## 2019-12-05 RX ORDER — METOCLOPRAMIDE HYDROCHLORIDE 5 MG/ML
10 INJECTION INTRAMUSCULAR; INTRAVENOUS
Status: COMPLETED | OUTPATIENT
Start: 2019-12-05 | End: 2019-12-05

## 2019-12-05 RX ORDER — CEPHALEXIN 500 MG/1
500 CAPSULE ORAL 2 TIMES DAILY
Qty: 10 CAP | Refills: 0 | Status: SHIPPED | OUTPATIENT
Start: 2019-12-05 | End: 2019-12-10

## 2019-12-05 RX ORDER — ONDANSETRON 4 MG/1
4 TABLET, ORALLY DISINTEGRATING ORAL
Qty: 10 TAB | Refills: 0 | Status: SHIPPED | OUTPATIENT
Start: 2019-12-05 | End: 2020-02-10

## 2019-12-05 RX ADMIN — SODIUM CHLORIDE 1000 ML: 900 INJECTION, SOLUTION INTRAVENOUS at 16:27

## 2019-12-05 RX ADMIN — DIPHENHYDRAMINE HYDROCHLORIDE 12.5 MG: 50 INJECTION, SOLUTION INTRAMUSCULAR; INTRAVENOUS at 16:27

## 2019-12-05 RX ADMIN — METOCLOPRAMIDE 10 MG: 5 INJECTION, SOLUTION INTRAMUSCULAR; INTRAVENOUS at 16:27

## 2019-12-05 RX ADMIN — CEFTRIAXONE 1 G: 1 INJECTION, POWDER, FOR SOLUTION INTRAMUSCULAR; INTRAVENOUS at 15:38

## 2019-12-05 NOTE — ED TRIAGE NOTES
Pt c/o n/v that started 3-4 days ago. Pt denies diarrhea. Pt states she had a vaginal delivery last week.

## 2019-12-05 NOTE — ED NOTES
I have reviewed discharge instructions with the patient. The patient verbalized understanding. Patient left ED via Discharge Method: ambulatory to Home with self. Opportunity for questions and clarification provided. Patient given 3 scripts. To continue your aftercare when you leave the hospital, you may receive an automated call from our care team to check in on how you are doing. This is a free service and part of our promise to provide the best care and service to meet your aftercare needs.  If you have questions, or wish to unsubscribe from this service please call [phone]. Thank you for Choosing our [facility] Emergency Department.

## 2019-12-05 NOTE — DISCHARGE INSTRUCTIONS
Patient Education        Nausea and Vomiting: Care Instructions  Your Care Instructions    When you are nauseated, you may feel weak and sweaty and notice a lot of saliva in your mouth. Nausea often leads to vomiting. Most of the time you do not need to worry about nausea and vomiting, but they can be signs of other illnesses. Two common causes of nausea and vomiting are stomach flu and food poisoning. Nausea and vomiting from viral stomach flu will usually start to improve within 24 hours. Nausea and vomiting from food poisoning may last from 12 to 48 hours. The doctor has checked you carefully, but problems can develop later. If you notice any problems or new symptoms, get medical treatment right away. Follow-up care is a key part of your treatment and safety. Be sure to make and go to all appointments, and call your doctor if you are having problems. It's also a good idea to know your test results and keep a list of the medicines you take. How can you care for yourself at home? · To prevent dehydration, drink plenty of fluids, enough so that your urine is light yellow or clear like water. Choose water and other caffeine-free clear liquids until you feel better. If you have kidney, heart, or liver disease and have to limit fluids, talk with your doctor before you increase the amount of fluids you drink. · Rest in bed until you feel better. · When you are able to eat, try clear soups, mild foods, and liquids until all symptoms are gone for 12 to 48 hours. Other good choices include dry toast, crackers, cooked cereal, and gelatin dessert, such as Jell-O. When should you call for help? Call 911 anytime you think you may need emergency care. For example, call if:    · You passed out (lost consciousness).    Call your doctor now or seek immediate medical care if:    · You have symptoms of dehydration, such as:  ? Dry eyes and a dry mouth. ? Passing only a little dark urine. ?  Feeling thirstier than usual.   · You have new or worsening belly pain.     · You have a new or higher fever.     · You vomit blood or what looks like coffee grounds.    Watch closely for changes in your health, and be sure to contact your doctor if:    · You have ongoing nausea and vomiting.     · Your vomiting is getting worse.     · Your vomiting lasts longer than 2 days.     · You are not getting better as expected. Where can you learn more? Go to http://pat-britney.info/. Enter 25 333647 in the search box to learn more about \"Nausea and Vomiting: Care Instructions. \"  Current as of: June 26, 2019  Content Version: 12.2  © 3515-1929 [employer]. Care instructions adapted under license by [employer] (which disclaims liability or warranty for this information). If you have questions about a medical condition or this instruction, always ask your healthcare professional. [place] [address] any warranty or liability for your use of this information. Patient Education        Urinary Tract Infection in Women: Care Instructions  Your Care Instructions    A urinary tract infection, or UTI, is a general term for an infection anywhere between the kidneys and the urethra (where urine comes out). Most UTIs are bladder infections. They often cause pain or burning when you urinate. UTIs are caused by bacteria and can be cured with antibiotics. Be sure to complete your treatment so that the infection goes away. Follow-up care is a key part of your treatment and safety. Be sure to make and go to all appointments, and call your doctor if you are having problems. It's also a good idea to know your test results and keep a list of the medicines you take. How can you care for yourself at home? · Take your antibiotics as directed. Do not stop taking them just because you feel better. You need to take the full course of antibiotics.   · Drink extra water and other fluids for the next day or two. This may help wash out the bacteria that are causing the infection. (If you have kidney, heart, or liver disease and have to limit fluids, talk with your doctor before you increase your fluid intake.)  · Avoid drinks that are carbonated or have caffeine. They can irritate the bladder. · Urinate often. Try to empty your bladder each time. · To relieve pain, take a hot bath or lay a heating pad set on low over your lower belly or genital area. Never go to sleep with a heating pad in place. To prevent UTIs  · Drink plenty of water each day. This helps you urinate often, which clears bacteria from your system. (If you have kidney, heart, or liver disease and have to limit fluids, talk with your doctor before you increase your fluid intake.)  · Urinate when you need to. · Urinate right after you have sex. · Change sanitary pads often. · Avoid douches, bubble baths, feminine hygiene sprays, and other feminine hygiene products that have deodorants. · After going to the bathroom, wipe from front to back. When should you call for help? Call your doctor now or seek immediate medical care if:    · Symptoms such as fever, chills, nausea, or vomiting get worse or appear for the first time.     · You have new pain in your back just below your rib cage. This is called flank pain.     · There is new blood or pus in your urine.     · You have any problems with your antibiotic medicine.    Watch closely for changes in your health, and be sure to contact your doctor if:    · You are not getting better after taking an antibiotic for 2 days.     · Your symptoms go away but then come back. Where can you learn more? Go to http://pat-britney.info/. Enter H678 in the search box to learn more about \"Urinary Tract Infection in Women: Care Instructions. \"  Current as of: December 19, 2018  Content Version: 12.2  © 4071-4916 [employer].  Care instructions adapted under license by [employer] (which disclaims liability or warranty for this information). If you have questions about a medical condition or this instruction, always ask your healthcare professional. [address] any warranty or liability for your use of this information.

## 2019-12-05 NOTE — ED PROVIDER NOTES
The history is provided by the patient and the spouse. Vomiting    This is a new problem. The current episode started more than 2 days ago. The problem occurs 5 to 10 times per day. The emesis has an appearance of stomach contents and clear. There has been no fever. Pertinent negatives include no chills, no fever, no sweats, no abdominal pain (Occasional cramps), no diarrhea, no headaches, no arthralgias, no myalgias, no cough, no URI and no headaches. The patient is not pregnant. Risk factors: 1 week postpartum. Her past medical history is significant for recent abdominal surgery (Postpartum vaginal delivery). Her pertinent negatives include no irritable bowel syndrome, no inflammatory bowel disease, no short gut syndrome, no bowel resection, no malabsorption, no gastric bypass and no DM.         Past Medical History:   Diagnosis Date    BMI 38.0-38.9,adult     Hypertension     Mom has High Blood pressure; pt has not had high blood pressure       Past Surgical History:   Procedure Laterality Date    BREAST SURGERY PROCEDURE UNLISTED      HX BREAST BIOPSY Right     benign    HX WISDOM TEETH EXTRACTION           Family History:   Problem Relation Age of Onset    Hypertension Mother     Diabetes Father     Lung Disease Father        Social History     Socioeconomic History    Marital status: SINGLE     Spouse name: Not on file    Number of children: Not on file    Years of education: Not on file    Highest education level: Not on file   Occupational History    Not on file   Social Needs    Financial resource strain: Not on file    Food insecurity:     Worry: Not on file     Inability: Not on file    Transportation needs:     Medical: Not on file     Non-medical: Not on file   Tobacco Use    Smoking status: Never Smoker    Smokeless tobacco: Never Used   Substance and Sexual Activity    Alcohol use: Not Currently     Comment: none now    Drug use: No    Sexual activity: Yes     Partners: Male Birth control/protection: None   Lifestyle    Physical activity:     Days per week: Not on file     Minutes per session: Not on file    Stress: Not on file   Relationships    Social connections:     Talks on phone: Not on file     Gets together: Not on file     Attends [employer] service: Not on file     Active member of club or organization: Not on file     Attends meetings of clubs or organizations: Not on file     Relationship status: Not on file    Intimate partner violence:     Fear of current or ex partner: Not on file     Emotionally abused: Not on file     Physically abused: Not on file     Forced sexual activity: Not on file   Other Topics Concern     Service Not Asked    Blood Transfusions Not Asked    Caffeine Concern Not Asked    Occupational Exposure Not Asked   [place] Hazards Not Asked    Sleep Concern Not Asked    Stress Concern Not Asked    Weight Concern Not Asked    Special Diet Not Asked    Back Care Not Asked    Exercise Not Asked    Bike Helmet Not Asked   [address] Not Asked    Self-Exams Not Asked   Social History Narrative    Not on file         ALLERGIES: Patient has no known allergies. Review of Systems   Constitutional: Negative for chills and fever. Respiratory: Negative for cough. Gastrointestinal: Positive for nausea and vomiting. Negative for abdominal pain (Occasional cramps), blood in stool, constipation and diarrhea. Musculoskeletal: Negative for arthralgias and myalgias. Neurological: Negative for headaches. All other systems reviewed and are negative. Vitals:    12/05/19 1421   BP: 132/88   Pulse: 60   Resp: 17   Temp: 99 °F (37.2 °C)   SpO2: 98%   Weight: 102.5 kg (226 lb)   Height: 5' 4\" (1.626 m)            Physical Exam  Vitals signs and nursing note reviewed. Constitutional:       General: She is not in acute distress. Appearance: She is well-developed. HENT:      Head: Normocephalic and atraumatic.       Right Ear: External ear normal.      Left Ear: External ear normal.   Eyes:      Extraocular Movements: Extraocular movements intact. Conjunctiva/sclera: Conjunctivae normal.      Pupils: Pupils are equal, round, and reactive to light. Neck:      Musculoskeletal: Normal range of motion and neck supple. Cardiovascular:      Rate and Rhythm: Normal rate and regular rhythm. Heart sounds: Normal heart sounds. No murmur. Pulmonary:      Effort: Pulmonary effort is normal.      Breath sounds: Normal breath sounds. Abdominal:      General: Bowel sounds are normal.      Palpations: Abdomen is soft. Tenderness: There is no tenderness. Hernia: No hernia is present. Musculoskeletal: Normal range of motion. General: No swelling. Skin:     General: Skin is warm and dry. Capillary Refill: Capillary refill takes less than 2 seconds. Neurological:      Mental Status: She is alert and oriented to person, place, and time.           MDM  Number of Diagnoses or Management Options  Non-intractable vomiting with nausea, unspecified vomiting type: new and requires workup  Urinary tract infection without hematuria, site unspecified: new and requires workup     Amount and/or Complexity of Data Reviewed  Clinical lab tests: ordered and reviewed  Review and summarize past medical records: yes    Risk of Complications, Morbidity, and/or Mortality  Presenting problems: moderate  Diagnostic procedures: minimal  Management options: moderate    Patient Progress  Patient progress: improved         Procedures    Results Reviewed:      Recent Results (from the past 24 hour(s))   CBC WITH AUTOMATED DIFF    Collection Time: 12/05/19  2:39 PM   Result Value Ref Range    WBC 8.9 4.3 - 11.1 K/uL    RBC 4.09 4.05 - 5.2 M/uL    HGB 12.4 11.7 - 15.4 g/dL    HCT 39.6 35.8 - 46.3 %    MCV 96.8 79.6 - 97.8 FL    MCH 30.3 26.1 - 32.9 PG    MCHC 31.3 (L) 31.4 - 35.0 g/dL    RDW 14.6 11.9 - 14.6 %    PLATELET 841 (H) 049 - 450 K/uL    MPV 9.4 9.4 - 12.3 FL    ABSOLUTE NRBC 0.00 0.0 - 0.2 K/uL    DF AUTOMATED      NEUTROPHILS 62 43 - 78 %    LYMPHOCYTES 28 13 - 44 %    MONOCYTES 9 4.0 - 12.0 %    EOSINOPHILS 1 0.5 - 7.8 %    BASOPHILS 0 0.0 - 2.0 %    IMMATURE GRANULOCYTES 1 0.0 - 5.0 %    ABS. NEUTROPHILS 5.5 1.7 - 8.2 K/UL    ABS. LYMPHOCYTES 2.5 0.5 - 4.6 K/UL    ABS. MONOCYTES 0.8 0.1 - 1.3 K/UL    ABS. EOSINOPHILS 0.1 0.0 - 0.8 K/UL    ABS. BASOPHILS 0.0 0.0 - 0.2 K/UL    ABS. IMM. GRANS. 0.1 0.0 - 0.5 K/UL   METABOLIC PANEL, COMPREHENSIVE    Collection Time: 12/05/19  2:39 PM   Result Value Ref Range    Sodium 139 136 - 145 mmol/L    Potassium 3.3 (L) 3.5 - 5.1 mmol/L    Chloride 106 98 - 107 mmol/L    CO2 29 21 - 32 mmol/L    Anion gap 4 (L) 7 - 16 mmol/L    Glucose 88 65 - 100 mg/dL    BUN 7 6 - 23 MG/DL    Creatinine 0.68 0.6 - 1.0 MG/DL    GFR est AA >60 >60 ml/min/1.73m2    GFR est non-AA >60 >60 ml/min/1.73m2    Calcium 8.7 8.3 - 10.4 MG/DL    Bilirubin, total 0.3 0.2 - 1.1 MG/DL    ALT (SGPT) 45 12 - 65 U/L    AST (SGOT) 14 (L) 15 - 37 U/L    Alk. phosphatase 116 50 - 130 U/L    Protein, total 7.0 6.3 - 8.2 g/dL    Albumin 2.8 (L) 3.5 - 5.0 g/dL    Globulin 4.2 (H) 2.3 - 3.5 g/dL    A-G Ratio 0.7 (L) 1.2 - 3.5     URINALYSIS W/ RFLX MICROSCOPIC    Collection Time: 12/05/19  2:39 PM   Result Value Ref Range    Color YELLOW      Appearance CLOUDY      Specific gravity 1.015 1.001 - 1.023      pH (UA) 7.5 5.0 - 9.0      Protein TRACE (A) NEG mg/dL    Glucose NEGATIVE  mg/dL    Ketone NEGATIVE  NEG mg/dL    Bilirubin NEGATIVE  NEG      Blood SMALL (A) NEG      Urobilinogen 2.0 (H) 0.2 - 1.0 EU/dL    Nitrites NEGATIVE  NEG      Leukocyte Esterase LARGE (A) NEG      WBC >100 (H) 0 /hpf    RBC 0-3 0 /hpf    Epithelial cells 10-20 0 /hpf    Bacteria 1+ (H) 0 /hpf    Casts 3-5 0 /lpf       I discussed the results of all labs, procedures, radiographs, and treatments with the patient and available family.   Treatment plan is agreed upon and the patient is ready for discharge. All voiced understanding of the discharge plan and medication instructions or changes as appropriate. Questions about treatment in the ED were answered. All were encouraged to return should symptoms worsen or new problems develop.

## 2020-02-10 PROBLEM — E66.01 SEVERE OBESITY (HCC): Status: ACTIVE | Noted: 2020-02-10
